# Patient Record
Sex: MALE | Race: WHITE | NOT HISPANIC OR LATINO | Employment: FULL TIME | RURAL
[De-identification: names, ages, dates, MRNs, and addresses within clinical notes are randomized per-mention and may not be internally consistent; named-entity substitution may affect disease eponyms.]

---

## 2020-08-03 ENCOUNTER — HISTORICAL (OUTPATIENT)
Dept: ADMINISTRATIVE | Facility: HOSPITAL | Age: 65
End: 2020-08-03

## 2020-09-29 LAB — CRC RECOMMENDATION EXT: NORMAL

## 2020-09-30 ENCOUNTER — HISTORICAL (OUTPATIENT)
Dept: ADMINISTRATIVE | Facility: HOSPITAL | Age: 65
End: 2020-09-30

## 2020-10-01 LAB
LAB AP CLINICAL INFORMATION: NORMAL
LAB AP DIAGNOSIS - HISTORICAL: NORMAL
LAB AP GROSS PATHOLOGY - HISTORICAL: NORMAL
LAB AP SPECIMEN SUBMITTED - HISTORICAL: NORMAL

## 2020-10-08 ENCOUNTER — HISTORICAL (OUTPATIENT)
Dept: ADMINISTRATIVE | Facility: HOSPITAL | Age: 65
End: 2020-10-08

## 2020-10-08 LAB
BASOPHILS # BLD AUTO: 0.08 X10E3/UL (ref 0–0.2)
BASOPHILS NFR BLD AUTO: 1.1 % (ref 0–1)
EOSINOPHIL # BLD AUTO: 0.15 X10E3/UL (ref 0–0.5)
EOSINOPHIL NFR BLD AUTO: 2 % (ref 1–4)
ERYTHROCYTE [DISTWIDTH] IN BLOOD BY AUTOMATED COUNT: 17.5 % (ref 11.5–14.5)
FERRITIN SERPL-MCNC: 18 NG/ML (ref 26–388)
HCT VFR BLD AUTO: 40 % (ref 40–54)
HGB BLD-MCNC: 12.6 G/DL (ref 13.5–18)
IMM GRANULOCYTES # BLD AUTO: 0.11 X10E3/UL (ref 0–0.04)
IMM GRANULOCYTES NFR BLD: 1.4 % (ref 0–0.4)
IRON SATN MFR SERPL: 14 % (ref 14–50)
IRON SERPL-MCNC: 68 UG/DL (ref 65–175)
LYMPHOCYTES # BLD AUTO: 1.31 X10E3/UL (ref 1–4.8)
LYMPHOCYTES NFR BLD AUTO: 17.3 % (ref 27–41)
MCH RBC QN AUTO: 29.6 PG (ref 27–31)
MCHC RBC AUTO-ENTMCNC: 31.5 G/DL (ref 32–36)
MCV RBC AUTO: 93.9 FL (ref 80–96)
MONOCYTES # BLD AUTO: 0.61 X10E3/UL (ref 0–0.8)
MONOCYTES NFR BLD AUTO: 8 % (ref 2–6)
MPC BLD CALC-MCNC: 11.6 FL (ref 9.4–12.4)
NEUTROPHILS # BLD AUTO: 5.33 X10E3/UL (ref 1.8–7.7)
NEUTROPHILS NFR BLD AUTO: 70.2 % (ref 53–65)
NRBC # BLD AUTO: 0 X10E3/UL (ref 0–0)
NRBC, AUTO (.00): 0 /100 (ref 0–0)
PLATELET # BLD AUTO: 268 X10E3/UL (ref 150–400)
RBC # BLD AUTO: 4.26 X10E6/UL (ref 4.6–6.2)
TIBC SERPL-MCNC: 477 UG/DL (ref 250–450)
WBC # BLD AUTO: 7.59 X10E3/UL (ref 4.5–11)

## 2021-07-23 VITALS — WEIGHT: 186 LBS | HEIGHT: 72 IN | BODY MASS INDEX: 25.19 KG/M2

## 2021-07-23 RX ORDER — CETIRIZINE HYDROCHLORIDE 10 MG/1
10 TABLET ORAL DAILY
COMMUNITY
End: 2022-02-11

## 2021-07-23 RX ORDER — METFORMIN HYDROCHLORIDE 1000 MG/1
1000 TABLET ORAL 2 TIMES DAILY WITH MEALS
COMMUNITY
Start: 2021-05-03 | End: 2021-08-11 | Stop reason: SDUPTHER

## 2021-07-23 RX ORDER — FERROUS SULFATE 325(65) MG
325 TABLET ORAL
COMMUNITY
End: 2021-08-11 | Stop reason: SDUPTHER

## 2021-07-23 RX ORDER — ZINC GLUCONATE 50 MG
50 TABLET ORAL DAILY
COMMUNITY
End: 2022-02-11

## 2021-07-23 RX ORDER — GEMFIBROZIL 600 MG/1
600 TABLET, FILM COATED ORAL 2 TIMES DAILY
COMMUNITY
Start: 2021-05-03 | End: 2021-08-11 | Stop reason: SDUPTHER

## 2021-07-23 RX ORDER — CARVEDILOL 25 MG/1
25 TABLET ORAL 2 TIMES DAILY
COMMUNITY
Start: 2021-04-23 | End: 2022-01-18

## 2021-07-23 RX ORDER — APIXABAN 5 MG/1
5 TABLET, FILM COATED ORAL 2 TIMES DAILY
COMMUNITY
Start: 2021-05-03 | End: 2021-08-11 | Stop reason: SDUPTHER

## 2021-07-23 RX ORDER — SITAGLIPTIN 100 MG/1
100 TABLET, FILM COATED ORAL DAILY
COMMUNITY
Start: 2021-04-23 | End: 2021-08-11 | Stop reason: SDUPTHER

## 2021-07-23 RX ORDER — GLIMEPIRIDE 4 MG/1
2 TABLET ORAL
COMMUNITY
Start: 2021-03-23 | End: 2021-08-11 | Stop reason: SDUPTHER

## 2021-07-23 RX ORDER — DAPAGLIFLOZIN 10 MG/1
10 TABLET, FILM COATED ORAL DAILY
COMMUNITY
Start: 2021-06-03 | End: 2021-08-11 | Stop reason: SDUPTHER

## 2021-07-27 ENCOUNTER — OFFICE VISIT (OUTPATIENT)
Dept: CARDIOLOGY | Facility: CLINIC | Age: 66
End: 2021-07-27
Payer: MEDICARE

## 2021-07-27 VITALS
HEART RATE: 92 BPM | BODY MASS INDEX: 25.87 KG/M2 | DIASTOLIC BLOOD PRESSURE: 72 MMHG | HEIGHT: 72 IN | WEIGHT: 191 LBS | OXYGEN SATURATION: 97 % | SYSTOLIC BLOOD PRESSURE: 128 MMHG

## 2021-07-27 DIAGNOSIS — I48.91 ATRIAL FIBRILLATION, UNSPECIFIED TYPE: Primary | ICD-10-CM

## 2021-07-27 DIAGNOSIS — I10 HYPERTENSION, UNSPECIFIED TYPE: ICD-10-CM

## 2021-07-27 DIAGNOSIS — I44.2 COMPLETE AV BLOCK: ICD-10-CM

## 2021-07-27 DIAGNOSIS — I48.0 PAROXYSMAL ATRIAL FIBRILLATION: ICD-10-CM

## 2021-07-27 DIAGNOSIS — E78.5 HYPERLIPIDEMIA, UNSPECIFIED HYPERLIPIDEMIA TYPE: ICD-10-CM

## 2021-07-27 DIAGNOSIS — I25.10 CORONARY ARTERY DISEASE INVOLVING NATIVE CORONARY ARTERY OF NATIVE HEART WITHOUT ANGINA PECTORIS: ICD-10-CM

## 2021-07-27 PROCEDURE — 93010 EKG 12-LEAD: ICD-10-PCS | Mod: S$PBB,,, | Performed by: INTERNAL MEDICINE

## 2021-07-27 PROCEDURE — 99214 OFFICE O/P EST MOD 30 MIN: CPT | Mod: PBBFAC | Performed by: NURSE PRACTITIONER

## 2021-07-27 PROCEDURE — 99214 PR OFFICE/OUTPT VISIT, EST, LEVL IV, 30-39 MIN: ICD-10-PCS | Mod: S$PBB,,, | Performed by: NURSE PRACTITIONER

## 2021-07-27 PROCEDURE — 93005 ELECTROCARDIOGRAM TRACING: CPT | Mod: PBBFAC | Performed by: INTERNAL MEDICINE

## 2021-07-27 PROCEDURE — 93010 ELECTROCARDIOGRAM REPORT: CPT | Mod: S$PBB,,, | Performed by: INTERNAL MEDICINE

## 2021-07-27 PROCEDURE — 99214 OFFICE O/P EST MOD 30 MIN: CPT | Mod: S$PBB,,, | Performed by: NURSE PRACTITIONER

## 2021-07-27 RX ORDER — ASPIRIN 81 MG/1
81 TABLET ORAL DAILY
COMMUNITY

## 2021-07-28 PROBLEM — I44.2 COMPLETE AV BLOCK: Status: ACTIVE | Noted: 2021-07-28

## 2021-07-30 DIAGNOSIS — Z95.0 PRESENCE OF PERMANENT CARDIAC PACEMAKER: Primary | ICD-10-CM

## 2021-08-06 ENCOUNTER — HOSPITAL ENCOUNTER (OUTPATIENT)
Dept: CARDIOLOGY | Facility: HOSPITAL | Age: 66
Discharge: HOME | End: 2021-08-06
Attending: INTERNAL MEDICINE
Payer: MEDICARE

## 2021-08-06 DIAGNOSIS — Z95.0 PRESENCE OF PERMANENT CARDIAC PACEMAKER: ICD-10-CM

## 2021-08-06 LAB
BATTERY VOLTAGE (V): 2.99 V
OHS CV DC PP MS1: 0.4 MS
OHS CV DC PP MS2: 0.4 MS
OHS CV DC PP V1: 1.5 V
OHS CV DC PP V2: 2 V

## 2021-08-06 PROCEDURE — 93280 PM DEVICE PROGR EVAL DUAL: CPT | Mod: 26,,, | Performed by: INTERNAL MEDICINE

## 2021-08-06 PROCEDURE — 93280 PM DEVICE PROGR EVAL DUAL: CPT

## 2021-08-06 PROCEDURE — 93280 CARDIAC DEVICE CHECK - IN CLINIC & HOSPITAL: ICD-10-PCS | Mod: 26,,, | Performed by: INTERNAL MEDICINE

## 2021-08-11 ENCOUNTER — OFFICE VISIT (OUTPATIENT)
Dept: FAMILY MEDICINE | Facility: CLINIC | Age: 66
End: 2021-08-11
Payer: MEDICARE

## 2021-08-11 VITALS
HEART RATE: 97 BPM | DIASTOLIC BLOOD PRESSURE: 62 MMHG | TEMPERATURE: 97 F | WEIGHT: 190.63 LBS | BODY MASS INDEX: 25.85 KG/M2 | OXYGEN SATURATION: 99 % | SYSTOLIC BLOOD PRESSURE: 120 MMHG | RESPIRATION RATE: 16 BRPM

## 2021-08-11 DIAGNOSIS — Z86.2 H/O IRON DEFICIENCY ANEMIA: ICD-10-CM

## 2021-08-11 DIAGNOSIS — I25.10 CORONARY ARTERY DISEASE INVOLVING NATIVE CORONARY ARTERY OF NATIVE HEART WITHOUT ANGINA PECTORIS: ICD-10-CM

## 2021-08-11 DIAGNOSIS — E78.5 HYPERLIPIDEMIA, UNSPECIFIED HYPERLIPIDEMIA TYPE: ICD-10-CM

## 2021-08-11 DIAGNOSIS — Z86.79 HISTORY OF PERICARDITIS: ICD-10-CM

## 2021-08-11 DIAGNOSIS — J43.1 PANLOBULAR EMPHYSEMA: ICD-10-CM

## 2021-08-11 DIAGNOSIS — E11.9 TYPE 2 DIABETES MELLITUS WITHOUT COMPLICATION, WITHOUT LONG-TERM CURRENT USE OF INSULIN: Primary | ICD-10-CM

## 2021-08-11 DIAGNOSIS — K21.9 GASTROESOPHAGEAL REFLUX DISEASE WITHOUT ESOPHAGITIS: ICD-10-CM

## 2021-08-11 DIAGNOSIS — Z12.5 SCREENING PSA (PROSTATE SPECIFIC ANTIGEN): ICD-10-CM

## 2021-08-11 DIAGNOSIS — K75.81 NASH (NONALCOHOLIC STEATOHEPATITIS): ICD-10-CM

## 2021-08-11 LAB
ALBUMIN SERPL BCP-MCNC: 4.1 G/DL (ref 3.5–5)
ALP SERPL-CCNC: 46 U/L (ref 45–115)
ALT SERPL W P-5'-P-CCNC: 26 U/L (ref 16–61)
ANION GAP SERPL CALCULATED.3IONS-SCNC: 16 MMOL/L (ref 7–16)
AST SERPL W P-5'-P-CCNC: 20 U/L (ref 15–37)
BASOPHILS # BLD AUTO: 0.05 K/UL (ref 0–0.2)
BASOPHILS NFR BLD AUTO: 1.1 % (ref 0–1)
BASOPHILS NFR BLD MANUAL: 1 % (ref 0–1)
BILIRUB DIRECT SERPL-MCNC: <0.1 MG/DL (ref 0–0.2)
BILIRUB SERPL-MCNC: 0.4 MG/DL (ref 0–1.2)
BUN SERPL-MCNC: 27 MG/DL (ref 7–18)
BUN/CREAT SERPL: 27 (ref 6–20)
CALCIUM SERPL-MCNC: 9.2 MG/DL (ref 8.5–10.1)
CHLORIDE SERPL-SCNC: 101 MMOL/L (ref 98–107)
CHOLEST SERPL-MCNC: 205 MG/DL (ref 0–200)
CHOLEST/HDLC SERPL: 8.2 {RATIO}
CO2 SERPL-SCNC: 22 MMOL/L (ref 21–32)
CREAT SERPL-MCNC: 1 MG/DL (ref 0.7–1.3)
DIFFERENTIAL METHOD BLD: ABNORMAL
EOSINOPHIL # BLD AUTO: 0.17 K/UL (ref 0–0.5)
EOSINOPHIL NFR BLD AUTO: 3.7 % (ref 1–4)
EOSINOPHIL NFR BLD MANUAL: 6 % (ref 1–4)
ERYTHROCYTE [DISTWIDTH] IN BLOOD BY AUTOMATED COUNT: 15.2 % (ref 11.5–14.5)
FERRITIN SERPL-MCNC: 22 NG/ML (ref 26–388)
GLUCOSE SERPL-MCNC: 127 MG/DL (ref 74–106)
HCT VFR BLD AUTO: 38.4 % (ref 40–54)
HDLC SERPL-MCNC: 25 MG/DL (ref 40–60)
HGB BLD-MCNC: 12.4 G/DL (ref 13.5–18)
IMM GRANULOCYTES # BLD AUTO: 0.17 K/UL (ref 0–0.04)
IMM GRANULOCYTES NFR BLD: 3.7 % (ref 0–0.4)
LDLC SERPL CALC-MCNC: 107 MG/DL
LDLC/HDLC SERPL: 4.3 {RATIO}
LYMPHOCYTES # BLD AUTO: 1.13 K/UL (ref 1–4.8)
LYMPHOCYTES NFR BLD AUTO: 24.7 % (ref 27–41)
LYMPHOCYTES NFR BLD MANUAL: 26 % (ref 27–41)
MCH RBC QN AUTO: 30.8 PG (ref 27–31)
MCHC RBC AUTO-ENTMCNC: 32.3 G/DL (ref 32–36)
MCV RBC AUTO: 95.5 FL (ref 80–96)
MONOCYTES # BLD AUTO: 0.48 K/UL (ref 0–0.8)
MONOCYTES NFR BLD AUTO: 10.5 % (ref 2–6)
MONOCYTES NFR BLD MANUAL: 12 % (ref 2–6)
MPC BLD CALC-MCNC: 11.4 FL (ref 9.4–12.4)
NEUTROPHILS # BLD AUTO: 2.57 K/UL (ref 1.8–7.7)
NEUTROPHILS NFR BLD AUTO: 56.3 % (ref 53–65)
NEUTS SEG NFR BLD MANUAL: 55 % (ref 50–62)
NONHDLC SERPL-MCNC: 180 MG/DL
NRBC # BLD AUTO: 0 X10E3/UL
NRBC, AUTO (.00): 0 %
PLATELET # BLD AUTO: 198 K/UL (ref 150–400)
PLATELET MORPHOLOGY: ABNORMAL
POTASSIUM SERPL-SCNC: 4.9 MMOL/L (ref 3.5–5.1)
PROT SERPL-MCNC: 8.2 G/DL (ref 6.4–8.2)
PSA SERPL-MCNC: 0.23 NG/ML (ref 0–4.1)
RBC # BLD AUTO: 4.02 M/UL (ref 4.6–6.2)
RBC MORPH BLD: NORMAL
SODIUM SERPL-SCNC: 134 MMOL/L (ref 136–145)
TRIGL SERPL-MCNC: 367 MG/DL (ref 35–150)
VLDLC SERPL-MCNC: 73 MG/DL
WBC # BLD AUTO: 4.57 K/UL (ref 4.5–11)

## 2021-08-11 PROCEDURE — 99214 OFFICE O/P EST MOD 30 MIN: CPT | Mod: ,,, | Performed by: INTERNAL MEDICINE

## 2021-08-11 PROCEDURE — 80061 LIPID PANEL: CPT | Mod: ,,, | Performed by: CLINICAL MEDICAL LABORATORY

## 2021-08-11 PROCEDURE — 83036 HEMOGLOBIN GLYCOSYLATED A1C: CPT | Mod: ,,, | Performed by: CLINICAL MEDICAL LABORATORY

## 2021-08-11 PROCEDURE — 85025 COMPLETE CBC W/AUTO DIFF WBC: CPT | Mod: ,,, | Performed by: CLINICAL MEDICAL LABORATORY

## 2021-08-11 PROCEDURE — 82248 HEPATIC FUNCTION PANEL: ICD-10-PCS | Mod: ,,, | Performed by: CLINICAL MEDICAL LABORATORY

## 2021-08-11 PROCEDURE — 80061 LIPID PANEL: ICD-10-PCS | Mod: ,,, | Performed by: CLINICAL MEDICAL LABORATORY

## 2021-08-11 PROCEDURE — 83036 HEMOGLOBIN A1C: ICD-10-PCS | Mod: ,,, | Performed by: CLINICAL MEDICAL LABORATORY

## 2021-08-11 PROCEDURE — 82248 BILIRUBIN DIRECT: CPT | Mod: ,,, | Performed by: CLINICAL MEDICAL LABORATORY

## 2021-08-11 PROCEDURE — G0103 PSA, SCREENING: ICD-10-PCS | Mod: ,,, | Performed by: CLINICAL MEDICAL LABORATORY

## 2021-08-11 PROCEDURE — 99214 PR OFFICE/OUTPT VISIT, EST, LEVL IV, 30-39 MIN: ICD-10-PCS | Mod: ,,, | Performed by: INTERNAL MEDICINE

## 2021-08-11 PROCEDURE — 82728 ASSAY OF FERRITIN: CPT | Mod: ,,, | Performed by: CLINICAL MEDICAL LABORATORY

## 2021-08-11 PROCEDURE — 80053 BASIC METABOLIC PANEL: ICD-10-PCS | Mod: ,,, | Performed by: CLINICAL MEDICAL LABORATORY

## 2021-08-11 PROCEDURE — 85025 CBC WITH DIFFERENTIAL: ICD-10-PCS | Mod: ,,, | Performed by: CLINICAL MEDICAL LABORATORY

## 2021-08-11 PROCEDURE — 82728 FERRITIN: ICD-10-PCS | Mod: ,,, | Performed by: CLINICAL MEDICAL LABORATORY

## 2021-08-11 PROCEDURE — G0103 PSA SCREENING: HCPCS | Mod: ,,, | Performed by: CLINICAL MEDICAL LABORATORY

## 2021-08-11 PROCEDURE — 80053 COMPREHEN METABOLIC PANEL: CPT | Mod: ,,, | Performed by: CLINICAL MEDICAL LABORATORY

## 2021-08-11 RX ORDER — FERROUS SULFATE 325(65) MG
325 TABLET ORAL
Qty: 270 TABLET | Refills: 1 | Status: SHIPPED | OUTPATIENT
Start: 2021-08-11 | End: 2022-11-11

## 2021-08-11 RX ORDER — ACETAMINOPHEN 500 MG
1 TABLET ORAL DAILY
COMMUNITY
End: 2022-02-11

## 2021-08-11 RX ORDER — ASCORBIC ACID 500 MG
500 TABLET ORAL DAILY
COMMUNITY
End: 2022-02-11

## 2021-08-11 RX ORDER — GLIMEPIRIDE 4 MG/1
2 TABLET ORAL
Qty: 45 TABLET | Refills: 1 | Status: SHIPPED | OUTPATIENT
Start: 2021-08-11 | End: 2022-02-11 | Stop reason: SDUPTHER

## 2021-08-11 RX ORDER — DAPAGLIFLOZIN 10 MG/1
10 TABLET, FILM COATED ORAL DAILY
Qty: 90 TABLET | Refills: 1 | Status: SHIPPED | OUTPATIENT
Start: 2021-08-11 | End: 2022-07-29

## 2021-08-11 RX ORDER — METFORMIN HYDROCHLORIDE 1000 MG/1
1000 TABLET ORAL 2 TIMES DAILY WITH MEALS
Qty: 180 TABLET | Refills: 1 | Status: SHIPPED | OUTPATIENT
Start: 2021-08-11 | End: 2022-02-11 | Stop reason: SDUPTHER

## 2021-08-11 RX ORDER — APIXABAN 5 MG/1
5 TABLET, FILM COATED ORAL 2 TIMES DAILY
Qty: 180 TABLET | Refills: 1 | Status: SHIPPED | OUTPATIENT
Start: 2021-08-11 | End: 2022-02-11 | Stop reason: SDUPTHER

## 2021-08-11 RX ORDER — GEMFIBROZIL 600 MG/1
600 TABLET, FILM COATED ORAL 2 TIMES DAILY
Qty: 180 TABLET | Refills: 1 | Status: SHIPPED | OUTPATIENT
Start: 2021-08-11 | End: 2022-02-11 | Stop reason: SDUPTHER

## 2021-08-11 RX ORDER — SITAGLIPTIN 100 MG/1
100 TABLET, FILM COATED ORAL DAILY
Qty: 90 TABLET | Refills: 1 | Status: SHIPPED | OUTPATIENT
Start: 2021-08-11 | End: 2022-01-18

## 2021-08-12 DIAGNOSIS — E11.9 TYPE 2 DIABETES MELLITUS WITHOUT COMPLICATION, WITHOUT LONG-TERM CURRENT USE OF INSULIN: Primary | ICD-10-CM

## 2021-08-12 LAB
EST. AVERAGE GLUCOSE BLD GHB EST-MCNC: 84 MG/DL
HBA1C MFR BLD HPLC: 5.1 % (ref 4.5–6.6)

## 2021-09-27 LAB
LEFT EYE DM RETINOPATHY: NORMAL
RIGHT EYE DM RETINOPATHY: NORMAL

## 2021-09-29 DIAGNOSIS — Z95.0 PRESENCE OF CARDIAC PACEMAKER: Primary | ICD-10-CM

## 2021-10-13 ENCOUNTER — HOSPITAL ENCOUNTER (OUTPATIENT)
Dept: CARDIOLOGY | Facility: HOSPITAL | Age: 66
Discharge: HOME | End: 2021-10-13
Attending: INTERNAL MEDICINE
Payer: MEDICARE

## 2021-10-13 DIAGNOSIS — Z95.0 PRESENCE OF CARDIAC PACEMAKER: Primary | ICD-10-CM

## 2021-10-13 DIAGNOSIS — Z95.0 PRESENCE OF CARDIAC PACEMAKER: ICD-10-CM

## 2021-10-13 PROCEDURE — 93296 REM INTERROG EVL PM/IDS: CPT

## 2021-10-13 PROCEDURE — 93294 CARDIAC DEVICE CHECK - REMOTE: ICD-10-PCS | Mod: ,,, | Performed by: INTERNAL MEDICINE

## 2021-10-13 PROCEDURE — 93294 REM INTERROG EVL PM/LDLS PM: CPT | Mod: ,,, | Performed by: INTERNAL MEDICINE

## 2021-10-29 LAB
OHS CV DC PP MS1: 0.4 MS
OHS CV DC PP MS2: 0.4 MS
OHS CV DC PP V1: 1.5 V
OHS CV DC PP V2: 2 V

## 2022-01-13 ENCOUNTER — HOSPITAL ENCOUNTER (OUTPATIENT)
Dept: CARDIOLOGY | Facility: HOSPITAL | Age: 67
Discharge: HOME | End: 2022-01-13
Attending: INTERNAL MEDICINE
Payer: MEDICARE

## 2022-01-13 DIAGNOSIS — Z95.0 PRESENCE OF CARDIAC PACEMAKER: ICD-10-CM

## 2022-01-13 DIAGNOSIS — Z95.0 PRESENCE OF CARDIAC PACEMAKER: Primary | ICD-10-CM

## 2022-01-13 PROCEDURE — 93296 REM INTERROG EVL PM/IDS: CPT

## 2022-01-13 PROCEDURE — 93294 CARDIAC DEVICE CHECK - REMOTE: ICD-10-PCS | Mod: ,,, | Performed by: INTERNAL MEDICINE

## 2022-01-13 PROCEDURE — 93294 REM INTERROG EVL PM/LDLS PM: CPT | Mod: ,,, | Performed by: INTERNAL MEDICINE

## 2022-02-02 ENCOUNTER — OFFICE VISIT (OUTPATIENT)
Dept: CARDIOLOGY | Facility: CLINIC | Age: 67
End: 2022-02-02
Payer: MEDICARE

## 2022-02-02 VITALS
SYSTOLIC BLOOD PRESSURE: 138 MMHG | DIASTOLIC BLOOD PRESSURE: 70 MMHG | WEIGHT: 188 LBS | HEART RATE: 84 BPM | OXYGEN SATURATION: 97 % | HEIGHT: 72 IN | BODY MASS INDEX: 25.47 KG/M2

## 2022-02-02 DIAGNOSIS — I10 HYPERTENSION, UNSPECIFIED TYPE: ICD-10-CM

## 2022-02-02 DIAGNOSIS — I10 HYPERTENSION, UNSPECIFIED TYPE: Primary | ICD-10-CM

## 2022-02-02 PROCEDURE — 93010 ELECTROCARDIOGRAM REPORT: CPT | Mod: S$PBB,,, | Performed by: INTERNAL MEDICINE

## 2022-02-02 PROCEDURE — 93010 EKG 12-LEAD: ICD-10-PCS | Mod: S$PBB,,, | Performed by: INTERNAL MEDICINE

## 2022-02-02 PROCEDURE — 99214 PR OFFICE/OUTPT VISIT, EST, LEVL IV, 30-39 MIN: ICD-10-PCS | Mod: S$PBB,,, | Performed by: NURSE PRACTITIONER

## 2022-02-02 PROCEDURE — 93005 ELECTROCARDIOGRAM TRACING: CPT | Mod: PBBFAC | Performed by: INTERNAL MEDICINE

## 2022-02-02 PROCEDURE — 99214 OFFICE O/P EST MOD 30 MIN: CPT | Mod: PBBFAC | Performed by: NURSE PRACTITIONER

## 2022-02-02 PROCEDURE — 99214 OFFICE O/P EST MOD 30 MIN: CPT | Mod: S$PBB,,, | Performed by: NURSE PRACTITIONER

## 2022-02-02 NOTE — PROGRESS NOTES
[facility] note        DATE OF SERVICE: 02/02/2022       PCP: Shivam Payne MD      CHIEF COMPLAINT:   Chief Complaint   Patient presents with    Follow-up     Patient denies any cardiac complaints today.        HISTORY OF PRESENT ILLNESS:  Pro Aguirre is a 66 y.o. male with a PMH of   Past Medical History:   Diagnosis Date    ARF (acute renal failure)     Atrial fibrillation     Carotid artery occlusion     normal dopplers 8/20    COPD (chronic obstructive pulmonary disease)     Coronary artery disease     Diabetes mellitus, type 2     GERD (gastroesophageal reflux disease)     H/O iron deficiency anemia     History of pericarditis     Hyperlipidemia     Hypertension     GARCÍA (nonalcoholic steatohepatitis)      who presents for routine follow up.   Chief Complaint   Patient presents with    Follow-up     Patient denies any cardiac complaints today.            PAST MEDICAL HISTORY:  Past Medical History:   Diagnosis Date    ARF (acute renal failure)     Atrial fibrillation     Carotid artery occlusion     normal dopplers 8/20    COPD (chronic obstructive pulmonary disease)     Coronary artery disease     Diabetes mellitus, type 2     GERD (gastroesophageal reflux disease)     H/O iron deficiency anemia     History of pericarditis     Hyperlipidemia     Hypertension     GARCÍA (nonalcoholic steatohepatitis)        PAST SURGICAL HISTORY:  Past Surgical History:   Procedure Laterality Date    APPENDECTOMY      TONSILLECTOMY         SOCIAL HISTORY:  Social History     Socioeconomic History    Marital status:    Tobacco Use    Smoking status: Never Smoker    Smokeless tobacco: Never Used   Substance and Sexual Activity    Alcohol use: Not Currently    Drug use: Never       FAMILY HISTORY:  Family History   Problem Relation Age of Onset    Hypertension Mother     Diabetes Mother          ALLERGIES:  Review of patient's allergies indicates:  No Known  Allergies     MEDICATIONS:    Current Outpatient Medications:     aspirin (ECOTRIN) 81 MG EC tablet, Take 81 mg by mouth once daily., Disp: , Rfl:     carvediloL (COREG) 25 MG tablet, TAKE ONE (1) TABLET BY MOUTH TWICE DAILY FOR BLOOD PRESSURE, Disp: 180 tablet, Rfl: 1    ELIQUIS 5 mg Tab, Take 1 tablet (5 mg total) by mouth 2 (two) times daily., Disp: 180 tablet, Rfl: 1    FARXIGA 10 mg tablet, Take 1 tablet (10 mg total) by mouth once daily., Disp: 90 tablet, Rfl: 1    ferrous sulfate (FEOSOL) 325 mg (65 mg iron) Tab tablet, Take 1 tablet (325 mg total) by mouth daily with breakfast., Disp: 270 tablet, Rfl: 1    gemfibroziL (LOPID) 600 MG tablet, Take 1 tablet (600 mg total) by mouth 2 (two) times a day., Disp: 180 tablet, Rfl: 1    glimepiride (AMARYL) 4 MG tablet, Take 0.5 tablets (2 mg total) by mouth daily with breakfast. (Patient taking differently: Take 2 mg by mouth 2 (two) times daily.), Disp: 45 tablet, Rfl: 1    JANUVIA 100 mg Tab, TAKE ONE (1) TABLET (100 MILLIGRAM TOTAL) BY MOUTH ONCE DAILY., Disp: 90 tablet, Rfl: 1    metFORMIN (GLUCOPHAGE) 1000 MG tablet, Take 1 tablet (1,000 mg total) by mouth 2 (two) times daily with meals., Disp: 180 tablet, Rfl: 1    TRELEGY ELLIPTA 100-62.5-25 mcg DsDv, INHALE 1 PUFF BY MOUTH DAILY FOR BREATHING, Disp: 3 each, Rfl: 1    ascorbic acid, vitamin C, (VITAMIN C) 500 MG tablet, Take 500 mg by mouth once daily., Disp: , Rfl:     cetirizine (ZYRTEC) 10 MG tablet, Take 10 mg by mouth once daily., Disp: , Rfl:     cholecalciferol, vitamin D3, (VITAMIN D3) 50 mcg (2,000 unit) Cap, Take 1 capsule by mouth once daily., Disp: , Rfl:     zinc gluconate 50 mg tablet, Take 50 mg by mouth once daily., Disp: , Rfl:   Medications have been reviewed and reconciled.     PHYSICAL EXAM:  /70 (BP Location: Left arm, Patient Position: Sitting)   Pulse 84   Ht 6' (1.829 m)   Wt 85.3 kg (188 lb)   SpO2 97%   BMI 25.50 kg/m²   Wt Readings from Last 3 Encounters:    02/02/22 85.3 kg (188 lb)   08/11/21 86.5 kg (190 lb 9.6 oz)   07/27/21 86.6 kg (191 lb)      Body mass index is 25.5 kg/m².    Physical Exam  Vitals and nursing note reviewed.   Constitutional:       Appearance: Normal appearance. He is normal weight.   HENT:      Head: Normocephalic and atraumatic.   Eyes:      Pupils: Pupils are equal, round, and reactive to light.   Neck:      Vascular: No carotid bruit.   Cardiovascular:      Rate and Rhythm: Normal rate and regular rhythm.      Pulses: Normal pulses.      Heart sounds: Normal heart sounds.      Comments: Well healed ppm site to left upper chest wall    Pulmonary:      Effort: Pulmonary effort is normal.      Breath sounds: Normal breath sounds.   Abdominal:      General: Bowel sounds are normal.      Palpations: Abdomen is soft.   Musculoskeletal:      Cervical back: Neck supple.      Right lower leg: No edema.      Left lower leg: No edema.   Skin:     General: Skin is warm and dry.      Capillary Refill: Capillary refill takes less than 2 seconds.   Neurological:      General: No focal deficit present.      Mental Status: He is alert and oriented to person, place, and time.   Psychiatric:         Mood and Affect: Mood normal.         Behavior: Behavior normal.         LABS REVIEWED:  Lab Results   Component Value Date    WBC 4.57 08/11/2021    RBC 4.02 (L) 08/11/2021    HGB 12.4 (L) 08/11/2021    HCT 38.4 (L) 08/11/2021    MCV 95.5 08/11/2021    MCH 30.8 08/11/2021    MCHC 32.3 08/11/2021    RDW 15.2 (H) 08/11/2021     08/11/2021    MPV 11.4 08/11/2021    NRBC 0.0 08/11/2021     Lab Results   Component Value Date     (L) 08/11/2021    K 4.9 08/11/2021     08/11/2021    CO2 22 08/11/2021    BUN 27 (H) 08/11/2021     Lab Results   Component Value Date    AST 20 08/11/2021    ALT 26 08/11/2021     Lab Results   Component Value Date     (H) 08/11/2021    HGBA1C 5.1 08/11/2021     Lab Results   Component Value Date    CHOL 205 (H)  08/11/2021    HDL 25 (L) 08/11/2021    TRIG 367 (H) 08/11/2021    CHOLHDL 8.2 08/11/2021       CARDIAC STUDIES REVIEWED:EKG: RSR with RBBB and old inferior q waves; HR 87 bpm      ASSESSMENT:   Patient Active Problem List   Diagnosis    Complete AV block    Hypertension    Hyperlipidemia    Coronary artery disease    Atrial fibrillation    GERD (gastroesophageal reflux disease)    COPD (chronic obstructive pulmonary disease)    H/O iron deficiency anemia    GARCÍA (nonalcoholic steatohepatitis)    Diabetes mellitus, type 2    History of pericarditis            Problem List Items Addressed This Visit        Cardiac/Vascular    Hypertension           PLAN:     RTC: 6 months

## 2022-02-09 LAB
BATTERY VOLTAGE (V): 2.98 V
OHS CV DC PP MS1: 0.4 MS
OHS CV DC PP MS2: 0.4 MS
OHS CV DC PP V1: 1.5 V
OHS CV DC PP V2: 2 V

## 2022-02-11 ENCOUNTER — OFFICE VISIT (OUTPATIENT)
Dept: FAMILY MEDICINE | Facility: CLINIC | Age: 67
End: 2022-02-11
Payer: MEDICARE

## 2022-02-11 VITALS
HEART RATE: 100 BPM | DIASTOLIC BLOOD PRESSURE: 76 MMHG | BODY MASS INDEX: 25.44 KG/M2 | RESPIRATION RATE: 16 BRPM | TEMPERATURE: 98 F | SYSTOLIC BLOOD PRESSURE: 122 MMHG | OXYGEN SATURATION: 99 % | WEIGHT: 187.63 LBS

## 2022-02-11 DIAGNOSIS — E11.9 TYPE 2 DIABETES MELLITUS WITHOUT COMPLICATION, WITHOUT LONG-TERM CURRENT USE OF INSULIN: Primary | ICD-10-CM

## 2022-02-11 DIAGNOSIS — I25.10 CORONARY ARTERY DISEASE INVOLVING NATIVE CORONARY ARTERY OF NATIVE HEART WITHOUT ANGINA PECTORIS: ICD-10-CM

## 2022-02-11 DIAGNOSIS — J43.1 PANLOBULAR EMPHYSEMA: ICD-10-CM

## 2022-02-11 DIAGNOSIS — Z86.2 H/O IRON DEFICIENCY ANEMIA: ICD-10-CM

## 2022-02-11 DIAGNOSIS — E78.5 HYPERLIPIDEMIA, UNSPECIFIED HYPERLIPIDEMIA TYPE: ICD-10-CM

## 2022-02-11 DIAGNOSIS — Z11.59 ENCOUNTER FOR HEPATITIS C SCREENING TEST FOR LOW RISK PATIENT: ICD-10-CM

## 2022-02-11 LAB
BASOPHILS # BLD AUTO: 0.09 K/UL (ref 0–0.2)
BASOPHILS NFR BLD AUTO: 1.7 % (ref 0–1)
CHOLEST SERPL-MCNC: 211 MG/DL (ref 0–200)
CHOLEST/HDLC SERPL: 7.5 {RATIO}
CREAT UR-MCNC: 37 MG/DL (ref 39–259)
DIFFERENTIAL METHOD BLD: ABNORMAL
EOSINOPHIL # BLD AUTO: 0.21 K/UL (ref 0–0.5)
EOSINOPHIL NFR BLD AUTO: 3.9 % (ref 1–4)
ERYTHROCYTE [DISTWIDTH] IN BLOOD BY AUTOMATED COUNT: 14.6 % (ref 11.5–14.5)
EST. AVERAGE GLUCOSE BLD GHB EST-MCNC: 114 MG/DL
HBA1C MFR BLD HPLC: 6 % (ref 4.5–6.6)
HCT VFR BLD AUTO: 41.1 % (ref 40–54)
HCV AB SER QL: NORMAL
HDLC SERPL-MCNC: 28 MG/DL (ref 40–60)
HGB BLD-MCNC: 13.3 G/DL (ref 13.5–18)
IMM GRANULOCYTES # BLD AUTO: 0.16 K/UL (ref 0–0.04)
IMM GRANULOCYTES NFR BLD: 2.9 % (ref 0–0.4)
LDLC SERPL CALC-MCNC: 112 MG/DL
LDLC/HDLC SERPL: 4 {RATIO}
LYMPHOCYTES # BLD AUTO: 1.23 K/UL (ref 1–4.8)
LYMPHOCYTES NFR BLD AUTO: 22.6 % (ref 27–41)
MCH RBC QN AUTO: 29.7 PG (ref 27–31)
MCHC RBC AUTO-ENTMCNC: 32.4 G/DL (ref 32–36)
MCV RBC AUTO: 91.7 FL (ref 80–96)
MICROALBUMIN UR-MCNC: 14.2 MG/DL (ref 0–2.8)
MICROALBUMIN/CREAT RATIO PNL UR: 383.8 MG/G (ref 0–30)
MONOCYTES # BLD AUTO: 0.49 K/UL (ref 0–0.8)
MONOCYTES NFR BLD AUTO: 9 % (ref 2–6)
MPC BLD CALC-MCNC: 11.1 FL (ref 9.4–12.4)
NEUTROPHILS # BLD AUTO: 3.26 K/UL (ref 1.8–7.7)
NEUTROPHILS NFR BLD AUTO: 59.9 % (ref 53–65)
NONHDLC SERPL-MCNC: 183 MG/DL
NRBC # BLD AUTO: 0 X10E3/UL
NRBC, AUTO (.00): 0 %
PLATELET # BLD AUTO: 251 K/UL (ref 150–400)
RBC # BLD AUTO: 4.48 M/UL (ref 4.6–6.2)
TRIGL SERPL-MCNC: 356 MG/DL (ref 35–150)
VLDLC SERPL-MCNC: 71 MG/DL
WBC # BLD AUTO: 5.44 K/UL (ref 4.5–11)

## 2022-02-11 PROCEDURE — 86803 HEPATITIS C AB TEST: CPT | Mod: ,,, | Performed by: CLINICAL MEDICAL LABORATORY

## 2022-02-11 PROCEDURE — 80061 LIPID PANEL: CPT | Mod: ,,, | Performed by: CLINICAL MEDICAL LABORATORY

## 2022-02-11 PROCEDURE — 86803 HEPATITIS C ANTIBODY: ICD-10-PCS | Mod: ,,, | Performed by: CLINICAL MEDICAL LABORATORY

## 2022-02-11 PROCEDURE — 99214 OFFICE O/P EST MOD 30 MIN: CPT | Mod: ,,, | Performed by: INTERNAL MEDICINE

## 2022-02-11 PROCEDURE — 83036 HEMOGLOBIN A1C: ICD-10-PCS | Mod: ,,, | Performed by: CLINICAL MEDICAL LABORATORY

## 2022-02-11 PROCEDURE — 99214 PR OFFICE/OUTPT VISIT, EST, LEVL IV, 30-39 MIN: ICD-10-PCS | Mod: ,,, | Performed by: INTERNAL MEDICINE

## 2022-02-11 PROCEDURE — 85025 CBC WITH DIFFERENTIAL: ICD-10-PCS | Mod: ,,, | Performed by: CLINICAL MEDICAL LABORATORY

## 2022-02-11 PROCEDURE — 85025 COMPLETE CBC W/AUTO DIFF WBC: CPT | Mod: ,,, | Performed by: CLINICAL MEDICAL LABORATORY

## 2022-02-11 PROCEDURE — 82570 MICROALBUMIN / CREATININE RATIO URINE: ICD-10-PCS | Mod: ,,, | Performed by: CLINICAL MEDICAL LABORATORY

## 2022-02-11 PROCEDURE — 83036 HEMOGLOBIN GLYCOSYLATED A1C: CPT | Mod: ,,, | Performed by: CLINICAL MEDICAL LABORATORY

## 2022-02-11 PROCEDURE — 82043 MICROALBUMIN / CREATININE RATIO URINE: ICD-10-PCS | Mod: ,,, | Performed by: CLINICAL MEDICAL LABORATORY

## 2022-02-11 PROCEDURE — 82570 ASSAY OF URINE CREATININE: CPT | Mod: ,,, | Performed by: CLINICAL MEDICAL LABORATORY

## 2022-02-11 PROCEDURE — 82043 UR ALBUMIN QUANTITATIVE: CPT | Mod: ,,, | Performed by: CLINICAL MEDICAL LABORATORY

## 2022-02-11 PROCEDURE — 80061 LIPID PANEL: ICD-10-PCS | Mod: ,,, | Performed by: CLINICAL MEDICAL LABORATORY

## 2022-02-11 RX ORDER — METFORMIN HYDROCHLORIDE 1000 MG/1
1000 TABLET ORAL 2 TIMES DAILY WITH MEALS
Qty: 180 TABLET | Refills: 1 | Status: SHIPPED | OUTPATIENT
Start: 2022-02-11 | End: 2022-09-27

## 2022-02-11 RX ORDER — GLIMEPIRIDE 4 MG/1
2 TABLET ORAL 2 TIMES DAILY
Qty: 90 TABLET | Refills: 1 | Status: SHIPPED | OUTPATIENT
Start: 2022-02-11 | End: 2022-08-12 | Stop reason: SDUPTHER

## 2022-02-11 RX ORDER — APIXABAN 5 MG/1
5 TABLET, FILM COATED ORAL 2 TIMES DAILY
Qty: 180 TABLET | Refills: 1 | Status: SHIPPED | OUTPATIENT
Start: 2022-02-11 | End: 2022-08-12 | Stop reason: SDUPTHER

## 2022-02-11 RX ORDER — GEMFIBROZIL 600 MG/1
600 TABLET, FILM COATED ORAL 2 TIMES DAILY
Qty: 180 TABLET | Refills: 1 | Status: SHIPPED | OUTPATIENT
Start: 2022-02-11 | End: 2022-02-15

## 2022-02-11 RX ORDER — SITAGLIPTIN 100 MG/1
TABLET, FILM COATED ORAL
Qty: 90 TABLET | Refills: 1 | Status: SHIPPED | OUTPATIENT
Start: 2022-02-11 | End: 2022-08-12 | Stop reason: SDUPTHER

## 2022-02-11 NOTE — PROGRESS NOTES
New Clinic Note    Patient Name:  Pro Aguirre is a 66 y.o. male     Chief Complaint:    Chief Complaint   Patient presents with    Follow-up     Patient is here for a checkup today.         Subjective  HPI         Current Outpatient Medications:     aspirin (ECOTRIN) 81 MG EC tablet, Take 81 mg by mouth once daily., Disp: , Rfl:     carvediloL (COREG) 25 MG tablet, TAKE ONE (1) TABLET BY MOUTH TWICE DAILY FOR BLOOD PRESSURE, Disp: 180 tablet, Rfl: 1    FARXIGA 10 mg tablet, Take 1 tablet (10 mg total) by mouth once daily., Disp: 90 tablet, Rfl: 1    ferrous sulfate (FEOSOL) 325 mg (65 mg iron) Tab tablet, Take 1 tablet (325 mg total) by mouth daily with breakfast. (Patient taking differently: Take 325 mg by mouth 3 (three) times daily.), Disp: 270 tablet, Rfl: 1    ELIQUIS 5 mg Tab, Take 1 tablet (5 mg total) by mouth 2 (two) times daily., Disp: 180 tablet, Rfl: 1    fluticasone-umeclidin-vilanter (TRELEGY ELLIPTA) 100-62.5-25 mcg DsDv, INHALE 1 PUFF BY MOUTH DAILY FOR BREATHING, Disp: 3 each, Rfl: 1    gemfibroziL (LOPID) 600 MG tablet, Take 1 tablet (600 mg total) by mouth 2 (two) times a day., Disp: 180 tablet, Rfl: 1    glimepiride (AMARYL) 4 MG tablet, Take 0.5 tablets (2 mg total) by mouth 2 (two) times daily., Disp: 90 tablet, Rfl: 1    JANUVIA 100 mg Tab, TAKE ONE (1) TABLET (100 MILLIGRAM TOTAL) BY MOUTH ONCE DAILY., Disp: 90 tablet, Rfl: 1    metFORMIN (GLUCOPHAGE) 1000 MG tablet, Take 1 tablet (1,000 mg total) by mouth 2 (two) times daily with meals., Disp: 180 tablet, Rfl: 1   Past Medical History:   Diagnosis Date    ARF (acute renal failure)     Atrial fibrillation     Carotid artery occlusion     normal dopplers 8/20    COPD (chronic obstructive pulmonary disease)     Coronary artery disease     Diabetes mellitus, type 2     GERD (gastroesophageal reflux disease)     H/O iron deficiency anemia     History of pericarditis     Hyperlipidemia     Hypertension     GARCÍA  (nonalcoholic steatohepatitis)       Past Surgical History:   Procedure Laterality Date    APPENDECTOMY      TONSILLECTOMY        Family History   Problem Relation Age of Onset    Hypertension Mother     Diabetes Mother       Social History     Tobacco Use    Smoking status: Never Smoker    Smokeless tobacco: Never Used   Substance Use Topics    Alcohol use: Not Currently    Drug use: Never        Review of Systems   Constitutional: Negative for fatigue and fever.   HENT: Negative for nasal congestion and sore throat.    Respiratory: Negative for cough, shortness of breath and wheezing.    Cardiovascular: Negative for chest pain and palpitations.   Gastrointestinal: Negative for abdominal pain and blood in stool.   Genitourinary: Negative for dysuria.   Musculoskeletal: Negative for back pain and neck pain.   Integumentary:  Negative for rash and mole/lesion.   Neurological: Negative for dizziness, headaches and memory loss.   Psychiatric/Behavioral: Negative for agitation. The patient is not nervous/anxious.         Objective:  /76 (BP Location: Left arm, Patient Position: Sitting)   Pulse 100   Temp 97.7 °F (36.5 °C) (Temporal)   Resp 16   Wt 85.1 kg (187 lb 9.6 oz)   SpO2 99%   BMI 25.44 kg/m²      Physical Exam  Constitutional:       Appearance: Normal appearance.   HENT:      Head: Normocephalic and atraumatic.      Right Ear: External ear normal.      Left Ear: External ear normal.      Nose: Nose normal.   Eyes:      Extraocular Movements: Extraocular movements intact.      Conjunctiva/sclera: Conjunctivae normal.      Pupils: Pupils are equal, round, and reactive to light.   Cardiovascular:      Rate and Rhythm: Normal rate and regular rhythm.      Pulses: Normal pulses.      Heart sounds: Normal heart sounds. No murmur heard.  No friction rub. No gallop.    Pulmonary:      Effort: Pulmonary effort is normal.      Breath sounds: No wheezing, rhonchi or rales.   Abdominal:      General:  Abdomen is flat.      Palpations: Abdomen is soft.   Musculoskeletal:      Cervical back: Normal range of motion and neck supple.      Right lower leg: No edema.      Left lower leg: No edema.   Skin:     General: Skin is warm and dry.      Findings: No rash.   Neurological:      General: No focal deficit present.      Mental Status: He is alert and oriented to person, place, and time.      Cranial Nerves: No cranial nerve deficit.   Psychiatric:         Mood and Affect: Mood normal.          Assessment and Plan    Type 2 diabetes mellitus without complication, without long-term current use of insulin  -     glimepiride (AMARYL) 4 MG tablet; Take 0.5 tablets (2 mg total) by mouth 2 (two) times daily.  Dispense: 90 tablet; Refill: 1  -     JANUVIA 100 mg Tab; TAKE ONE (1) TABLET (100 MILLIGRAM TOTAL) BY MOUTH ONCE DAILY.  Dispense: 90 tablet; Refill: 1  -     metFORMIN (GLUCOPHAGE) 1000 MG tablet; Take 1 tablet (1,000 mg total) by mouth 2 (two) times daily with meals.  Dispense: 180 tablet; Refill: 1  -     Hemoglobin A1C; Future; Expected date: 02/11/2022  -     Microalbumin/Creatinine Ratio, Urine    Coronary artery disease involving native coronary artery of native heart without angina pectoris  -     ELIQUIS 5 mg Tab; Take 1 tablet (5 mg total) by mouth 2 (two) times daily.  Dispense: 180 tablet; Refill: 1    Hyperlipidemia, unspecified hyperlipidemia type  -     gemfibroziL (LOPID) 600 MG tablet; Take 1 tablet (600 mg total) by mouth 2 (two) times a day.  Dispense: 180 tablet; Refill: 1  -     Lipid Panel; Future; Expected date: 02/11/2022    Panlobular emphysema  -     fluticasone-umeclidin-vilanter (TRELEGY ELLIPTA) 100-62.5-25 mcg DsDv; INHALE 1 PUFF BY MOUTH DAILY FOR BREATHING  Dispense: 3 each; Refill: 1    Encounter for hepatitis C screening test for low risk patient  -     Hepatitis C Antibody; Future; Expected date: 02/11/2022    H/O iron deficiency anemia  -     CBC Auto Differential; Future; Expected  date: 02/11/2022         Problem List Items Addressed This Visit        Pulmonary    COPD (chronic obstructive pulmonary disease)    Relevant Medications    fluticasone-umeclidin-vilanter (TRELEGY ELLIPTA) 100-62.5-25 mcg DsDv       Cardiac/Vascular    Hyperlipidemia    Relevant Medications    gemfibroziL (LOPID) 600 MG tablet    Other Relevant Orders    Lipid Panel    Coronary artery disease    Overview     Very mild CAD by [facility] 8/31/2015         Relevant Medications    ELIQUIS 5 mg Tab       Oncology    H/O iron deficiency anemia    Relevant Orders    CBC Auto Differential       Endocrine    Diabetes mellitus, type 2 - Primary    Relevant Medications    glimepiride (AMARYL) 4 MG tablet    JANUVIA 100 mg Tab    metFORMIN (GLUCOPHAGE) 1000 MG tablet    Other Relevant Orders    Hemoglobin A1C    Microalbumin/Creatinine Ratio, Urine      Other Visit Diagnoses     Encounter for hepatitis C screening test for low risk patient        Relevant Orders    Hepatitis C Antibody         1-DMII-a1c  2-check lipids-may have to try a statin again-he had elevated liver enzymes at one point in the past, may stop the lopid  3-he wants the Covid booster in 1-2 months  4-Breathing has been doing well  Follow up in about 3 months (around 5/11/2022).

## 2022-02-15 DIAGNOSIS — E78.5 HYPERLIPIDEMIA, UNSPECIFIED HYPERLIPIDEMIA TYPE: Primary | ICD-10-CM

## 2022-02-15 RX ORDER — ATORVASTATIN CALCIUM 10 MG/1
10 TABLET, FILM COATED ORAL NIGHTLY
COMMUNITY
End: 2022-02-15 | Stop reason: SDUPTHER

## 2022-02-15 RX ORDER — ATORVASTATIN CALCIUM 10 MG/1
10 TABLET, FILM COATED ORAL NIGHTLY
Qty: 90 TABLET | Refills: 1 | Status: SHIPPED | OUTPATIENT
Start: 2022-02-15 | End: 2022-08-12 | Stop reason: SDUPTHER

## 2022-02-15 NOTE — TELEPHONE ENCOUNTER
----- Message from Shivam Payne MD sent at 2/15/2022  7:25 AM CST -----  Stop lopid and start Lipitor 10mg qd and check lipid liver 3 months

## 2022-03-11 DIAGNOSIS — Z71.89 COMPLEX CARE COORDINATION: ICD-10-CM

## 2022-04-20 ENCOUNTER — HOSPITAL ENCOUNTER (OUTPATIENT)
Dept: CARDIOLOGY | Facility: HOSPITAL | Age: 67
Discharge: HOME | End: 2022-04-20
Attending: INTERNAL MEDICINE
Payer: MEDICARE

## 2022-04-20 DIAGNOSIS — Z95.0 PRESENCE OF CARDIAC PACEMAKER: Primary | ICD-10-CM

## 2022-04-20 DIAGNOSIS — Z95.0 PRESENCE OF CARDIAC PACEMAKER: ICD-10-CM

## 2022-04-20 PROCEDURE — 93296 REM INTERROG EVL PM/IDS: CPT

## 2022-04-20 PROCEDURE — 93294 CARDIAC DEVICE CHECK - REMOTE: ICD-10-PCS | Mod: ,,, | Performed by: INTERNAL MEDICINE

## 2022-04-20 PROCEDURE — 93294 REM INTERROG EVL PM/LDLS PM: CPT | Mod: ,,, | Performed by: INTERNAL MEDICINE

## 2022-05-10 PROBLEM — I44.2 COMPLETE AV BLOCK: Chronic | Status: ACTIVE | Noted: 2021-07-28

## 2022-05-13 ENCOUNTER — OFFICE VISIT (OUTPATIENT)
Dept: FAMILY MEDICINE | Facility: CLINIC | Age: 67
End: 2022-05-13
Payer: MEDICARE

## 2022-05-13 VITALS
WEIGHT: 187 LBS | SYSTOLIC BLOOD PRESSURE: 138 MMHG | OXYGEN SATURATION: 99 % | RESPIRATION RATE: 16 BRPM | BODY MASS INDEX: 25.36 KG/M2 | TEMPERATURE: 97 F | HEART RATE: 100 BPM | DIASTOLIC BLOOD PRESSURE: 82 MMHG

## 2022-05-13 DIAGNOSIS — K75.81 NASH (NONALCOHOLIC STEATOHEPATITIS): ICD-10-CM

## 2022-05-13 DIAGNOSIS — E78.5 HYPERLIPIDEMIA, UNSPECIFIED HYPERLIPIDEMIA TYPE: ICD-10-CM

## 2022-05-13 DIAGNOSIS — I10 HYPERTENSION, UNSPECIFIED TYPE: ICD-10-CM

## 2022-05-13 DIAGNOSIS — E11.9 TYPE 2 DIABETES MELLITUS WITHOUT COMPLICATION, WITHOUT LONG-TERM CURRENT USE OF INSULIN: Primary | ICD-10-CM

## 2022-05-13 LAB
ALBUMIN SERPL BCP-MCNC: 4.2 G/DL (ref 3.5–5)
ALP SERPL-CCNC: 57 U/L (ref 45–115)
ALT SERPL W P-5'-P-CCNC: 34 U/L (ref 16–61)
AST SERPL W P-5'-P-CCNC: 23 U/L (ref 15–37)
BILIRUB DIRECT SERPL-MCNC: 0.2 MG/DL (ref 0–0.2)
BILIRUB SERPL-MCNC: 0.4 MG/DL (ref 0–1.2)
CHOLEST SERPL-MCNC: 127 MG/DL (ref 0–200)
CHOLEST/HDLC SERPL: 5.1 {RATIO}
EST. AVERAGE GLUCOSE BLD GHB EST-MCNC: 144 MG/DL
HBA1C MFR BLD HPLC: 6.9 % (ref 4.5–6.6)
HDLC SERPL-MCNC: 25 MG/DL (ref 40–60)
LDLC SERPL CALC-MCNC: 39 MG/DL
LDLC/HDLC SERPL: 1.6 {RATIO}
NONHDLC SERPL-MCNC: 102 MG/DL
PROT SERPL-MCNC: 8 G/DL (ref 6.4–8.2)
TRIGL SERPL-MCNC: 317 MG/DL (ref 35–150)
VLDLC SERPL-MCNC: 63 MG/DL

## 2022-05-13 PROCEDURE — 99214 OFFICE O/P EST MOD 30 MIN: CPT | Mod: ,,, | Performed by: INTERNAL MEDICINE

## 2022-05-13 PROCEDURE — 99214 PR OFFICE/OUTPT VISIT, EST, LEVL IV, 30-39 MIN: ICD-10-PCS | Mod: ,,, | Performed by: INTERNAL MEDICINE

## 2022-05-13 PROCEDURE — 80076 HEPATIC FUNCTION PANEL: CPT | Mod: ,,, | Performed by: CLINICAL MEDICAL LABORATORY

## 2022-05-13 PROCEDURE — 80076 HEPATIC FUNCTION PANEL: ICD-10-PCS | Mod: ,,, | Performed by: CLINICAL MEDICAL LABORATORY

## 2022-05-13 PROCEDURE — 83036 HEMOGLOBIN GLYCOSYLATED A1C: CPT | Mod: ,,, | Performed by: CLINICAL MEDICAL LABORATORY

## 2022-05-13 PROCEDURE — 83036 HEMOGLOBIN A1C: ICD-10-PCS | Mod: ,,, | Performed by: CLINICAL MEDICAL LABORATORY

## 2022-05-13 PROCEDURE — 80061 LIPID PANEL: CPT | Mod: ,,, | Performed by: CLINICAL MEDICAL LABORATORY

## 2022-05-13 PROCEDURE — 80061 LIPID PANEL: ICD-10-PCS | Mod: ,,, | Performed by: CLINICAL MEDICAL LABORATORY

## 2022-05-13 RX ORDER — MULTIVITAMIN
1 TABLET ORAL DAILY
COMMUNITY

## 2022-05-13 RX ORDER — ZINC GLUCONATE 50 MG
50 TABLET ORAL DAILY
COMMUNITY

## 2022-05-13 RX ORDER — CARVEDILOL 25 MG/1
TABLET ORAL
Qty: 180 TABLET | Refills: 1 | Status: SHIPPED | OUTPATIENT
Start: 2022-05-13 | End: 2023-01-20

## 2022-05-13 NOTE — PROGRESS NOTES
New Clinic Note    Patient Name:  Pro Aguirre is a 67 y.o. male     Chief Complaint:    Chief Complaint   Patient presents with    Follow-up     Patient is here for his checkup today.         Subjective  HPI         Current Outpatient Medications:     aspirin (ECOTRIN) 81 MG EC tablet, Take 81 mg by mouth once daily., Disp: , Rfl:     atorvastatin (LIPITOR) 10 MG tablet, Take 1 tablet (10 mg total) by mouth every evening., Disp: 90 tablet, Rfl: 1    ELIQUIS 5 mg Tab, Take 1 tablet (5 mg total) by mouth 2 (two) times daily., Disp: 180 tablet, Rfl: 1    FARXIGA 10 mg tablet, Take 1 tablet (10 mg total) by mouth once daily., Disp: 90 tablet, Rfl: 1    ferrous sulfate (FEOSOL) 325 mg (65 mg iron) Tab tablet, Take 1 tablet (325 mg total) by mouth daily with breakfast. (Patient taking differently: Take 325 mg by mouth once daily.), Disp: 270 tablet, Rfl: 1    fluticasone-umeclidin-vilanter (TRELEGY ELLIPTA) 100-62.5-25 mcg DsDv, INHALE 1 PUFF BY MOUTH DAILY FOR BREATHING, Disp: 3 each, Rfl: 1    glimepiride (AMARYL) 4 MG tablet, Take 0.5 tablets (2 mg total) by mouth 2 (two) times daily., Disp: 90 tablet, Rfl: 1    JANUVIA 100 mg Tab, TAKE ONE (1) TABLET (100 MILLIGRAM TOTAL) BY MOUTH ONCE DAILY., Disp: 90 tablet, Rfl: 1    metFORMIN (GLUCOPHAGE) 1000 MG tablet, Take 1 tablet (1,000 mg total) by mouth 2 (two) times daily with meals., Disp: 180 tablet, Rfl: 1    multivitamin (ONE DAILY MULTIVITAMIN) per tablet, Take 1 tablet by mouth once daily., Disp: , Rfl:     zinc gluconate 50 mg tablet, Take 50 mg by mouth once daily., Disp: , Rfl:     carvediloL (COREG) 25 MG tablet, TAKE ONE (1) TABLET BY MOUTH TWICE DAILY FOR BLOOD PRESSURE, Disp: 180 tablet, Rfl: 1   Past Medical History:   Diagnosis Date    ARF (acute renal failure)     Atrial fibrillation     Carotid artery occlusion     normal dopplers 8/20    COPD (chronic obstructive pulmonary disease)     Coronary artery disease     Diabetes  mellitus, type 2     GERD (gastroesophageal reflux disease)     H/O iron deficiency anemia     History of pericarditis     Hyperlipidemia     Hypertension     GARCÍA (nonalcoholic steatohepatitis)       Past Surgical History:   Procedure Laterality Date    APPENDECTOMY      TONSILLECTOMY        Family History   Problem Relation Age of Onset    Hypertension Mother     Diabetes Mother       Social History     Tobacco Use    Smoking status: Never Smoker    Smokeless tobacco: Never Used   Substance Use Topics    Alcohol use: Not Currently    Drug use: Never        Review of Systems   Constitutional: Negative for fatigue and fever.   HENT: Negative for nasal congestion and sore throat.    Respiratory: Negative for cough, shortness of breath and wheezing.    Cardiovascular: Negative for chest pain and palpitations.   Gastrointestinal: Negative for abdominal pain and blood in stool.   Genitourinary: Negative for dysuria.   Musculoskeletal: Negative for back pain and neck pain.   Integumentary:  Negative for rash and mole/lesion.   Neurological: Negative for dizziness, headaches and memory loss.   Psychiatric/Behavioral: Negative for agitation. The patient is not nervous/anxious.         Objective:  /82 (BP Location: Left arm, Patient Position: Sitting)   Pulse 100   Temp 96.8 °F (36 °C) (Temporal)   Resp 16   Wt 84.8 kg (187 lb)   SpO2 99%   BMI 25.36 kg/m²      Physical Exam  Constitutional:       Appearance: Normal appearance.   HENT:      Head: Normocephalic and atraumatic.      Right Ear: External ear normal.      Left Ear: External ear normal.      Nose: Nose normal.   Eyes:      Extraocular Movements: Extraocular movements intact.      Conjunctiva/sclera: Conjunctivae normal.      Pupils: Pupils are equal, round, and reactive to light.   Cardiovascular:      Rate and Rhythm: Normal rate and regular rhythm.      Pulses: Normal pulses.      Heart sounds: Normal heart sounds. No murmur heard.     No friction rub. No gallop.   Pulmonary:      Effort: Pulmonary effort is normal.      Breath sounds: No wheezing, rhonchi or rales.   Abdominal:      General: Abdomen is flat.      Palpations: Abdomen is soft.   Musculoskeletal:      Cervical back: Normal range of motion and neck supple.      Right lower leg: No edema.      Left lower leg: No edema.   Skin:     General: Skin is warm and dry.      Findings: No rash.   Neurological:      General: No focal deficit present.      Mental Status: He is alert and oriented to person, place, and time.      Cranial Nerves: No cranial nerve deficit.   Psychiatric:         Mood and Affect: Mood normal.          Assessment and Plan    Type 2 diabetes mellitus without complication, without long-term current use of insulin  -     Hemoglobin A1C; Future; Expected date: 05/13/2022    Hypertension, unspecified type  -     carvediloL (COREG) 25 MG tablet; TAKE ONE (1) TABLET BY MOUTH TWICE DAILY FOR BLOOD PRESSURE  Dispense: 180 tablet; Refill: 1    Hyperlipidemia, unspecified hyperlipidemia type  -     Lipid Panel; Future; Expected date: 05/13/2022    GARCÍA (nonalcoholic steatohepatitis)  -     Hepatic Function Panel; Future; Expected date: 05/13/2022         Problem List Items Addressed This Visit        Cardiac/Vascular    Hypertension (Chronic)    Relevant Medications    carvediloL (COREG) 25 MG tablet    Hyperlipidemia (Chronic)    Relevant Orders    Lipid Panel       Endocrine    Diabetes mellitus, type 2 - Primary (Chronic)    Relevant Orders    Hemoglobin A1C       GI    GARCÍA (nonalcoholic steatohepatitis) (Chronic)    Relevant Orders    Hepatic Function Panel       1-Needs a1c  2-we started lipitor last time-check lipid and liver, he had elevated liver enzymes in the past secondary to GARCÍA.  He has lost 4 pound    Follow up in about 3 months (around 8/13/2022).

## 2022-08-09 ENCOUNTER — HOSPITAL ENCOUNTER (OUTPATIENT)
Dept: CARDIOLOGY | Facility: HOSPITAL | Age: 67
Discharge: HOME | End: 2022-08-09
Attending: INTERNAL MEDICINE
Payer: MEDICARE

## 2022-08-09 ENCOUNTER — OFFICE VISIT (OUTPATIENT)
Dept: CARDIOLOGY | Facility: CLINIC | Age: 67
End: 2022-08-09
Payer: MEDICARE

## 2022-08-09 VITALS
HEART RATE: 86 BPM | HEIGHT: 72 IN | OXYGEN SATURATION: 98 % | WEIGHT: 186 LBS | DIASTOLIC BLOOD PRESSURE: 72 MMHG | BODY MASS INDEX: 25.19 KG/M2 | SYSTOLIC BLOOD PRESSURE: 118 MMHG

## 2022-08-09 DIAGNOSIS — Z95.0 PRESENCE OF CARDIAC PACEMAKER: ICD-10-CM

## 2022-08-09 DIAGNOSIS — I48.91 ATRIAL FIBRILLATION, UNSPECIFIED TYPE: Primary | ICD-10-CM

## 2022-08-09 PROCEDURE — 93280 PM DEVICE PROGR EVAL DUAL: CPT

## 2022-08-09 PROCEDURE — 93010 EKG 12-LEAD: ICD-10-PCS | Mod: S$PBB,,, | Performed by: INTERNAL MEDICINE

## 2022-08-09 PROCEDURE — 99214 OFFICE O/P EST MOD 30 MIN: CPT | Mod: S$PBB,,, | Performed by: NURSE PRACTITIONER

## 2022-08-09 PROCEDURE — 99214 PR OFFICE/OUTPT VISIT, EST, LEVL IV, 30-39 MIN: ICD-10-PCS | Mod: S$PBB,,, | Performed by: NURSE PRACTITIONER

## 2022-08-09 PROCEDURE — 93005 ELECTROCARDIOGRAM TRACING: CPT | Mod: PBBFAC | Performed by: INTERNAL MEDICINE

## 2022-08-09 PROCEDURE — 93010 ELECTROCARDIOGRAM REPORT: CPT | Mod: S$PBB,,, | Performed by: INTERNAL MEDICINE

## 2022-08-09 PROCEDURE — 93280 CARDIAC DEVICE CHECK - IN CLINIC & HOSPITAL: ICD-10-PCS | Mod: 26,,, | Performed by: INTERNAL MEDICINE

## 2022-08-09 PROCEDURE — 93280 PM DEVICE PROGR EVAL DUAL: CPT | Mod: 26,,, | Performed by: INTERNAL MEDICINE

## 2022-08-09 PROCEDURE — 99214 OFFICE O/P EST MOD 30 MIN: CPT | Mod: PBBFAC | Performed by: NURSE PRACTITIONER

## 2022-08-09 NOTE — PROGRESS NOTES
[facility] note        DATE OF SERVICE: 08/09/2022       PCP: Shivam Payne MD      CHIEF COMPLAINT:   Chief Complaint   Patient presents with    Follow-up     Patient denies any cardiac complaints today.        HISTORY OF PRESENT ILLNESS:  Pro Aguirre is a 67 y.o. male with a PMH of   Past Medical History:   Diagnosis Date    ARF (acute renal failure)     Atrial fibrillation     Carotid artery occlusion     normal dopplers 8/20    COPD (chronic obstructive pulmonary disease)     Coronary artery disease     Diabetes mellitus, type 2     GERD (gastroesophageal reflux disease)     H/O iron deficiency anemia     History of pericarditis     Hyperlipidemia     Hypertension     GARCÍA (nonalcoholic steatohepatitis)      who presents for routine follow up.  Chief Complaint   Patient presents with    Follow-up     Patient denies any cardiac complaints today.            PAST MEDICAL HISTORY:  Past Medical History:   Diagnosis Date    ARF (acute renal failure)     Atrial fibrillation     Carotid artery occlusion     normal dopplers 8/20    COPD (chronic obstructive pulmonary disease)     Coronary artery disease     Diabetes mellitus, type 2     GERD (gastroesophageal reflux disease)     H/O iron deficiency anemia     History of pericarditis     Hyperlipidemia     Hypertension     GARCÍA (nonalcoholic steatohepatitis)        PAST SURGICAL HISTORY:  Past Surgical History:   Procedure Laterality Date    APPENDECTOMY      TONSILLECTOMY         SOCIAL HISTORY:  Social History     Socioeconomic History    Marital status:    Tobacco Use    Smoking status: Never Smoker    Smokeless tobacco: Never Used   Substance and Sexual Activity    Alcohol use: Not Currently    Drug use: Never       FAMILY HISTORY:  Family History   Problem Relation Age of Onset    Hypertension Mother     Diabetes Mother          ALLERGIES:  Review of patient's allergies indicates:  No Known  Allergies     MEDICATIONS:    Current Outpatient Medications:     aspirin (ECOTRIN) 81 MG EC tablet, Take 81 mg by mouth once daily., Disp: , Rfl:     atorvastatin (LIPITOR) 10 MG tablet, Take 1 tablet (10 mg total) by mouth every evening., Disp: 90 tablet, Rfl: 1    carvediloL (COREG) 25 MG tablet, TAKE ONE (1) TABLET BY MOUTH TWICE DAILY FOR BLOOD PRESSURE, Disp: 180 tablet, Rfl: 1    ELIQUIS 5 mg Tab, Take 1 tablet (5 mg total) by mouth 2 (two) times daily., Disp: 180 tablet, Rfl: 1    FARXIGA 10 mg tablet, TAKE ONE (1) TABLET (10 MILLIGRAM TOTAL) BY MOUTH ONCE DAILY., Disp: 90 tablet, Rfl: 1    ferrous sulfate (FEOSOL) 325 mg (65 mg iron) Tab tablet, Take 1 tablet (325 mg total) by mouth daily with breakfast. (Patient taking differently: Take 325 mg by mouth once daily.), Disp: 270 tablet, Rfl: 1    fluticasone-umeclidin-vilanter (TRELEGY ELLIPTA) 100-62.5-25 mcg DsDv, INHALE 1 PUFF BY MOUTH DAILY FOR BREATHING, Disp: 3 each, Rfl: 1    glimepiride (AMARYL) 4 MG tablet, Take 0.5 tablets (2 mg total) by mouth 2 (two) times daily., Disp: 90 tablet, Rfl: 1    JANUVIA 100 mg Tab, TAKE ONE (1) TABLET (100 MILLIGRAM TOTAL) BY MOUTH ONCE DAILY., Disp: 90 tablet, Rfl: 1    metFORMIN (GLUCOPHAGE) 1000 MG tablet, Take 1 tablet (1,000 mg total) by mouth 2 (two) times daily with meals., Disp: 180 tablet, Rfl: 1    multivitamin (THERAGRAN) per tablet, Take 1 tablet by mouth once daily., Disp: , Rfl:     zinc gluconate 50 mg tablet, Take 50 mg by mouth once daily., Disp: , Rfl:   Medications have been reviewed and reconciled.     PHYSICAL EXAM:  /72 (BP Location: Left arm, Patient Position: Sitting)   Pulse 86   Ht 6' (1.829 m)   Wt 84.4 kg (186 lb)   SpO2 98%   BMI 25.23 kg/m²   Wt Readings from Last 3 Encounters:   08/09/22 84.4 kg (186 lb)   05/13/22 84.8 kg (187 lb)   02/11/22 85.1 kg (187 lb 9.6 oz)      Body mass index is 25.23 kg/m².    Physical Exam  Vitals and nursing note reviewed.    Constitutional:       Appearance: Normal appearance. He is normal weight.   HENT:      Head: Normocephalic and atraumatic.   Eyes:      Pupils: Pupils are equal, round, and reactive to light.   Neck:      Vascular: No carotid bruit.   Cardiovascular:      Rate and Rhythm: Normal rate and regular rhythm.      Pulses: Normal pulses.      Heart sounds: Normal heart sounds.      Comments: Well healed ppm site to left upper chest wall  Abdominal:      General: Bowel sounds are normal.   Musculoskeletal:      Cervical back: Neck supple.      Right lower leg: No edema.      Left lower leg: No edema.   Skin:     General: Skin is dry.      Capillary Refill: Capillary refill takes less than 2 seconds.   Neurological:      General: No focal deficit present.      Mental Status: He is alert and oriented to person, place, and time.   Psychiatric:         Mood and Affect: Mood normal.         Behavior: Behavior normal.         LABS REVIEWED:  Lab Results   Component Value Date    WBC 5.44 02/11/2022    RBC 4.48 (L) 02/11/2022    HGB 13.3 (L) 02/11/2022    HCT 41.1 02/11/2022    MCV 91.7 02/11/2022    MCH 29.7 02/11/2022    MCHC 32.4 02/11/2022    RDW 14.6 (H) 02/11/2022     02/11/2022    MPV 11.1 02/11/2022    NRBC 0.0 02/11/2022     Lab Results   Component Value Date     (L) 08/11/2021    K 4.9 08/11/2021     08/11/2021    CO2 22 08/11/2021    BUN 27 (H) 08/11/2021     Lab Results   Component Value Date    AST 23 05/13/2022    ALT 34 05/13/2022     Lab Results   Component Value Date     (H) 08/11/2021    HGBA1C 6.9 (H) 05/13/2022     Lab Results   Component Value Date    CHOL 127 05/13/2022    HDL 25 (L) 05/13/2022    TRIG 317 (H) 05/13/2022    CHOLHDL 5.1 05/13/2022       CARDIAC STUDIES REVIEWED:EKG:a paced v sensed; rhythm; RBBB; HR 85 bpm; old inferior q waves; no significant change from EKG done 2/2/2021        ASSESSMENT:   Patient Active Problem List   Diagnosis    Complete AV block     Hypertension    Hyperlipidemia    Coronary artery disease    Atrial fibrillation    GERD (gastroesophageal reflux disease)    COPD (chronic obstructive pulmonary disease)    H/O iron deficiency anemia    GARCÍA (nonalcoholic steatohepatitis)    Diabetes mellitus, type 2    History of pericarditis            Problem List Items Addressed This Visit        Cardiac/Vascular    Atrial fibrillation - Primary (Chronic)    Overview     Paroxysmal; cva prophylaxis with Eliquis           Relevant Orders    EKG 12-lead           PLAN:  Orders Placed This Encounter   Procedures    EKG 12-lead     Standing Status:   Future     Standing Expiration Date:   8/9/2023      RTC: 6 months

## 2022-08-12 ENCOUNTER — OFFICE VISIT (OUTPATIENT)
Dept: FAMILY MEDICINE | Facility: CLINIC | Age: 67
End: 2022-08-12
Payer: MEDICARE

## 2022-08-12 VITALS
RESPIRATION RATE: 16 BRPM | HEART RATE: 91 BPM | DIASTOLIC BLOOD PRESSURE: 78 MMHG | OXYGEN SATURATION: 97 % | TEMPERATURE: 97 F | BODY MASS INDEX: 25.33 KG/M2 | SYSTOLIC BLOOD PRESSURE: 132 MMHG | HEIGHT: 72 IN | WEIGHT: 187 LBS

## 2022-08-12 DIAGNOSIS — Z12.5 SCREENING PSA (PROSTATE SPECIFIC ANTIGEN): ICD-10-CM

## 2022-08-12 DIAGNOSIS — I10 PRIMARY HYPERTENSION: ICD-10-CM

## 2022-08-12 DIAGNOSIS — Z86.2 H/O IRON DEFICIENCY ANEMIA: ICD-10-CM

## 2022-08-12 DIAGNOSIS — E11.9 TYPE 2 DIABETES MELLITUS WITHOUT COMPLICATION, WITHOUT LONG-TERM CURRENT USE OF INSULIN: Primary | ICD-10-CM

## 2022-08-12 DIAGNOSIS — M79.642 LEFT HAND PAIN: ICD-10-CM

## 2022-08-12 DIAGNOSIS — I25.10 CORONARY ARTERY DISEASE INVOLVING NATIVE CORONARY ARTERY OF NATIVE HEART WITHOUT ANGINA PECTORIS: ICD-10-CM

## 2022-08-12 DIAGNOSIS — E78.5 HYPERLIPIDEMIA, UNSPECIFIED HYPERLIPIDEMIA TYPE: ICD-10-CM

## 2022-08-12 LAB
ANION GAP SERPL CALCULATED.3IONS-SCNC: 14 MMOL/L (ref 7–16)
BATTERY VOLTAGE (V): 2.97 V
BUN SERPL-MCNC: 33 MG/DL (ref 7–18)
BUN/CREAT SERPL: 29 (ref 6–20)
CALCIUM SERPL-MCNC: 9.8 MG/DL (ref 8.5–10.1)
CHLORIDE SERPL-SCNC: 97 MMOL/L (ref 98–107)
CO2 SERPL-SCNC: 26 MMOL/L (ref 21–32)
CREAT SERPL-MCNC: 1.14 MG/DL (ref 0.7–1.3)
EGFR (NO RACE VARIABLE) (RUSH/TITUS): 70 ML/MIN/1.73M²
EST. AVERAGE GLUCOSE BLD GHB EST-MCNC: 144 MG/DL
GLUCOSE SERPL-MCNC: 172 MG/DL (ref 74–106)
HBA1C MFR BLD HPLC: 6.9 % (ref 4.5–6.6)
OHS CV DC PP MS1: 0.4 MS
OHS CV DC PP MS2: 0.4 MS
OHS CV DC PP V1: 1.5 V
OHS CV DC PP V2: 2 V
POTASSIUM SERPL-SCNC: 5.4 MMOL/L (ref 3.5–5.1)
PSA SERPL-MCNC: 0.22 NG/ML (ref 0–4.4)
SODIUM SERPL-SCNC: 132 MMOL/L (ref 136–145)

## 2022-08-12 PROCEDURE — 99214 PR OFFICE/OUTPT VISIT, EST, LEVL IV, 30-39 MIN: ICD-10-PCS | Mod: ,,, | Performed by: INTERNAL MEDICINE

## 2022-08-12 PROCEDURE — 80048 BASIC METABOLIC PNL TOTAL CA: CPT | Mod: ,,, | Performed by: CLINICAL MEDICAL LABORATORY

## 2022-08-12 PROCEDURE — 99214 OFFICE O/P EST MOD 30 MIN: CPT | Mod: ,,, | Performed by: INTERNAL MEDICINE

## 2022-08-12 PROCEDURE — 83036 HEMOGLOBIN GLYCOSYLATED A1C: CPT | Mod: ,,, | Performed by: CLINICAL MEDICAL LABORATORY

## 2022-08-12 PROCEDURE — 83036 HEMOGLOBIN A1C: ICD-10-PCS | Mod: ,,, | Performed by: CLINICAL MEDICAL LABORATORY

## 2022-08-12 PROCEDURE — G0103 PSA SCREENING: HCPCS | Mod: ,,, | Performed by: CLINICAL MEDICAL LABORATORY

## 2022-08-12 PROCEDURE — 80048 BASIC METABOLIC PANEL: ICD-10-PCS | Mod: ,,, | Performed by: CLINICAL MEDICAL LABORATORY

## 2022-08-12 PROCEDURE — G0103 PSA, SCREENING: ICD-10-PCS | Mod: ,,, | Performed by: CLINICAL MEDICAL LABORATORY

## 2022-08-12 RX ORDER — APIXABAN 5 MG/1
5 TABLET, FILM COATED ORAL 2 TIMES DAILY
Qty: 180 TABLET | Refills: 1 | Status: SHIPPED | OUTPATIENT
Start: 2022-08-12 | End: 2023-02-10 | Stop reason: SDUPTHER

## 2022-08-12 RX ORDER — GLIMEPIRIDE 4 MG/1
2 TABLET ORAL 2 TIMES DAILY
Qty: 90 TABLET | Refills: 1 | Status: SHIPPED | OUTPATIENT
Start: 2022-08-12 | End: 2022-12-27

## 2022-08-12 RX ORDER — SITAGLIPTIN 100 MG/1
TABLET, FILM COATED ORAL
Qty: 90 TABLET | Refills: 1 | Status: SHIPPED | OUTPATIENT
Start: 2022-08-12 | End: 2023-01-12

## 2022-08-12 RX ORDER — ATORVASTATIN CALCIUM 10 MG/1
10 TABLET, FILM COATED ORAL NIGHTLY
Qty: 90 TABLET | Refills: 1 | Status: SHIPPED | OUTPATIENT
Start: 2022-08-12 | End: 2022-11-11 | Stop reason: SDUPTHER

## 2022-08-12 RX ORDER — FERROUS SULFATE 325(65) MG
325 TABLET ORAL DAILY
Qty: 90 TABLET | Refills: 1 | Status: CANCELLED | OUTPATIENT
Start: 2022-08-12

## 2022-08-12 NOTE — PROGRESS NOTES
New Clinic Note    Patient Name:  Pro Aguirre is a 67 y.o. male     Chief Complaint:    Chief Complaint   Patient presents with    Follow-up     Patient is here for his checkup today.     Hand Pain     He reports a burning sensation that comes and goes between his left 4th and 5th fingers.     Colon Cancer Screening     He had his colonoscopy in 2020 and it was suggested that he repeat it in one year. He refuses scheduling at this time.         Subjective  HPI         Current Outpatient Medications:     aspirin (ECOTRIN) 81 MG EC tablet, Take 81 mg by mouth once daily., Disp: , Rfl:     carvediloL (COREG) 25 MG tablet, TAKE ONE (1) TABLET BY MOUTH TWICE DAILY FOR BLOOD PRESSURE, Disp: 180 tablet, Rfl: 1    FARXIGA 10 mg tablet, TAKE ONE (1) TABLET (10 MILLIGRAM TOTAL) BY MOUTH ONCE DAILY., Disp: 90 tablet, Rfl: 1    ferrous sulfate (FEOSOL) 325 mg (65 mg iron) Tab tablet, Take 1 tablet (325 mg total) by mouth daily with breakfast. (Patient taking differently: Take 325 mg by mouth once daily.), Disp: 270 tablet, Rfl: 1    fluticasone-umeclidin-vilanter (TRELEGY ELLIPTA) 100-62.5-25 mcg DsDv, INHALE 1 PUFF BY MOUTH DAILY FOR BREATHING, Disp: 3 each, Rfl: 1    metFORMIN (GLUCOPHAGE) 1000 MG tablet, Take 1 tablet (1,000 mg total) by mouth 2 (two) times daily with meals., Disp: 180 tablet, Rfl: 1    multivitamin (THERAGRAN) per tablet, Take 1 tablet by mouth once daily., Disp: , Rfl:     zinc gluconate 50 mg tablet, Take 50 mg by mouth once daily., Disp: , Rfl:     atorvastatin (LIPITOR) 10 MG tablet, Take 1 tablet (10 mg total) by mouth every evening., Disp: 90 tablet, Rfl: 1    ELIQUIS 5 mg Tab, Take 1 tablet (5 mg total) by mouth 2 (two) times daily., Disp: 180 tablet, Rfl: 1    glimepiride (AMARYL) 4 MG tablet, Take 0.5 tablets (2 mg total) by mouth 2 (two) times daily., Disp: 90 tablet, Rfl: 1    JANUVIA 100 mg Tab, TAKE ONE (1) TABLET (100 MILLIGRAM TOTAL) BY MOUTH ONCE DAILY., Disp: 90  tablet, Rfl: 1   Past Medical History:   Diagnosis Date    ARF (acute renal failure)     Atrial fibrillation     Carotid artery occlusion     normal dopplers 8/20    COPD (chronic obstructive pulmonary disease)     Coronary artery disease     Diabetes mellitus, type 2     GERD (gastroesophageal reflux disease)     H/O iron deficiency anemia     History of pericarditis     Hyperlipidemia     Hypertension     GARCÍA (nonalcoholic steatohepatitis)       Past Surgical History:   Procedure Laterality Date    APPENDECTOMY      TONSILLECTOMY        Family History   Problem Relation Age of Onset    Hypertension Mother     Diabetes Mother       Social History     Tobacco Use    Smoking status: Never Smoker    Smokeless tobacco: Never Used   Substance Use Topics    Alcohol use: Not Currently    Drug use: Never        Review of Systems   Constitutional: Negative for fatigue and fever.   HENT: Negative for nasal congestion and sore throat.    Respiratory: Negative for cough, shortness of breath and wheezing.    Cardiovascular: Negative for chest pain and palpitations.   Gastrointestinal: Negative for abdominal pain and blood in stool.   Genitourinary: Negative for dysuria.   Musculoskeletal: Negative for back pain and neck pain.   Integumentary:  Negative for rash and mole/lesion.   Neurological: Negative for dizziness, headaches and memory loss.   Psychiatric/Behavioral: Negative for agitation. The patient is not nervous/anxious.         Objective:  /78 (BP Location: Left arm, Patient Position: Sitting)   Pulse 91   Temp 97 °F (36.1 °C) (Temporal)   Resp 16   Ht 6' (1.829 m)   Wt 84.8 kg (187 lb)   SpO2 97%   BMI 25.36 kg/m²      Physical Exam  Constitutional:       Appearance: Normal appearance.   HENT:      Head: Normocephalic and atraumatic.      Right Ear: External ear normal.      Left Ear: External ear normal.      Nose: Nose normal.   Eyes:      Extraocular Movements: Extraocular movements  intact.      Conjunctiva/sclera: Conjunctivae normal.      Pupils: Pupils are equal, round, and reactive to light.   Cardiovascular:      Rate and Rhythm: Normal rate and regular rhythm.      Pulses: Normal pulses.      Heart sounds: Normal heart sounds. No murmur heard.    No friction rub. No gallop.   Pulmonary:      Effort: Pulmonary effort is normal.      Breath sounds: No wheezing, rhonchi or rales.   Abdominal:      General: Abdomen is flat.      Palpations: Abdomen is soft.   Musculoskeletal:      Cervical back: Normal range of motion and neck supple.      Right lower leg: No edema.      Left lower leg: No edema.   Skin:     General: Skin is warm and dry.      Findings: No rash.   Neurological:      General: No focal deficit present.      Mental Status: He is alert and oriented to person, place, and time.      Cranial Nerves: No cranial nerve deficit.   Psychiatric:         Mood and Affect: Mood normal.          Assessment and Plan    Type 2 diabetes mellitus without complication, without long-term current use of insulin  -     JANUVIA 100 mg Tab; TAKE ONE (1) TABLET (100 MILLIGRAM TOTAL) BY MOUTH ONCE DAILY.  Dispense: 90 tablet; Refill: 1  -     glimepiride (AMARYL) 4 MG tablet; Take 0.5 tablets (2 mg total) by mouth 2 (two) times daily.  Dispense: 90 tablet; Refill: 1  -     Hemoglobin A1C; Future; Expected date: 08/12/2022    Coronary artery disease involving native coronary artery of native heart without angina pectoris  -     ELIQUIS 5 mg Tab; Take 1 tablet (5 mg total) by mouth 2 (two) times daily.  Dispense: 180 tablet; Refill: 1    H/O iron deficiency anemia    Hyperlipidemia, unspecified hyperlipidemia type  -     atorvastatin (LIPITOR) 10 MG tablet; Take 1 tablet (10 mg total) by mouth every evening.  Dispense: 90 tablet; Refill: 1    Primary hypertension  -     Basic Metabolic Panel; Future; Expected date: 08/12/2022    Screening PSA (prostate specific antigen)  -     PSA, Screening; Future;  Expected date: 08/12/2022    Left hand pain         Problem List Items Addressed This Visit        Cardiac/Vascular    Hypertension (Chronic)    Relevant Orders    Basic Metabolic Panel    Hyperlipidemia (Chronic)    Relevant Medications    atorvastatin (LIPITOR) 10 MG tablet    Coronary artery disease (Chronic)    Overview     Very mild CAD by [facility] 8/31/2015           Relevant Medications    ELIQUIS 5 mg Tab       Oncology    H/O iron deficiency anemia (Chronic)       Endocrine    Diabetes mellitus, type 2 - Primary (Chronic)    Relevant Medications    JANUVIA 100 mg Tab    glimepiride (AMARYL) 4 MG tablet    Other Relevant Orders    Hemoglobin A1C      Other Visit Diagnoses     Screening PSA (prostate specific antigen)        Relevant Orders    PSA, Screening    Left hand pain           1-needs a1c, he thinks it's going to be high  2-he can stop the iron, last hct was normal  3-left hand pain, nothing acute will monitor  4-bp is good    Follow up in about 3 months (around 11/12/2022).

## 2022-08-31 ENCOUNTER — OFFICE VISIT (OUTPATIENT)
Dept: FAMILY MEDICINE | Facility: CLINIC | Age: 67
End: 2022-08-31
Payer: MEDICARE

## 2022-08-31 VITALS
DIASTOLIC BLOOD PRESSURE: 74 MMHG | TEMPERATURE: 97 F | HEART RATE: 85 BPM | SYSTOLIC BLOOD PRESSURE: 160 MMHG | RESPIRATION RATE: 16 BRPM | WEIGHT: 187 LBS | BODY MASS INDEX: 25.36 KG/M2 | OXYGEN SATURATION: 98 %

## 2022-08-31 DIAGNOSIS — Z20.822 COUGH WITH EXPOSURE TO COVID-19 VIRUS: ICD-10-CM

## 2022-08-31 DIAGNOSIS — R05.8 COUGH WITH EXPOSURE TO COVID-19 VIRUS: ICD-10-CM

## 2022-08-31 DIAGNOSIS — U07.1 COVID-19: Primary | ICD-10-CM

## 2022-08-31 LAB
CTP QC/QA: YES
FLUAV AG NPH QL: NEGATIVE
FLUBV AG NPH QL: NEGATIVE
SARS-COV-2 AG RESP QL IA.RAPID: POSITIVE

## 2022-08-31 PROCEDURE — 99213 PR OFFICE/OUTPT VISIT, EST, LEVL III, 20-29 MIN: ICD-10-PCS | Mod: CR,,, | Performed by: INTERNAL MEDICINE

## 2022-08-31 PROCEDURE — 99213 OFFICE O/P EST LOW 20 MIN: CPT | Mod: CR,,, | Performed by: INTERNAL MEDICINE

## 2022-08-31 PROCEDURE — 87428 SARSCOV & INF VIR A&B AG IA: CPT | Mod: RHCUB | Performed by: INTERNAL MEDICINE

## 2022-08-31 NOTE — PROGRESS NOTES
New Clinic Note    Patient Name:  Pro Aguirre is a 67 y.o. male     Chief Complaint:    Chief Complaint   Patient presents with    Sinus Problem     Patient c/o sinus congestion/drainage, runny nose, chills, dry cough, sore throat, and headache for four days. He was exposed to covid last week.     Did not bring meds        Subjective  HPI         Current Outpatient Medications:     aspirin (ECOTRIN) 81 MG EC tablet, Take 81 mg by mouth once daily., Disp: , Rfl:     atorvastatin (LIPITOR) 10 MG tablet, Take 1 tablet (10 mg total) by mouth every evening., Disp: 90 tablet, Rfl: 1    carvediloL (COREG) 25 MG tablet, TAKE ONE (1) TABLET BY MOUTH TWICE DAILY FOR BLOOD PRESSURE, Disp: 180 tablet, Rfl: 1    ELIQUIS 5 mg Tab, Take 1 tablet (5 mg total) by mouth 2 (two) times daily., Disp: 180 tablet, Rfl: 1    FARXIGA 10 mg tablet, TAKE ONE (1) TABLET (10 MILLIGRAM TOTAL) BY MOUTH ONCE DAILY., Disp: 90 tablet, Rfl: 1    ferrous sulfate (FEOSOL) 325 mg (65 mg iron) Tab tablet, Take 1 tablet (325 mg total) by mouth daily with breakfast. (Patient taking differently: Take 325 mg by mouth once daily.), Disp: 270 tablet, Rfl: 1    fluticasone-umeclidin-vilanter (TRELEGY ELLIPTA) 100-62.5-25 mcg DsDv, INHALE ONE (1) PUFF BY MOUTH DAILY FOR BREATHING, Disp: 1 each, Rfl: 5    glimepiride (AMARYL) 4 MG tablet, Take 0.5 tablets (2 mg total) by mouth 2 (two) times daily., Disp: 90 tablet, Rfl: 1    JANUVIA 100 mg Tab, TAKE ONE (1) TABLET (100 MILLIGRAM TOTAL) BY MOUTH ONCE DAILY., Disp: 90 tablet, Rfl: 1    metFORMIN (GLUCOPHAGE) 1000 MG tablet, Take 1 tablet (1,000 mg total) by mouth 2 (two) times daily with meals., Disp: 180 tablet, Rfl: 1    multivitamin (THERAGRAN) per tablet, Take 1 tablet by mouth once daily., Disp: , Rfl:     zinc gluconate 50 mg tablet, Take 50 mg by mouth once daily., Disp: , Rfl:    Past Medical History:   Diagnosis Date    ARF (acute renal failure)     Atrial fibrillation     Carotid artery  occlusion     normal dopplers 8/20    COPD (chronic obstructive pulmonary disease)     Coronary artery disease     Diabetes mellitus, type 2     GERD (gastroesophageal reflux disease)     H/O iron deficiency anemia     History of pericarditis     Hyperlipidemia     Hypertension     GARCÍA (nonalcoholic steatohepatitis)       Past Surgical History:   Procedure Laterality Date    APPENDECTOMY      TONSILLECTOMY        Family History   Problem Relation Age of Onset    Hypertension Mother     Diabetes Mother       Social History     Tobacco Use    Smoking status: Never    Smokeless tobacco: Never   Substance Use Topics    Alcohol use: Not Currently    Drug use: Never        Review of Systems   Constitutional:  Positive for fever. Negative for fatigue.   HENT:  Positive for nasal congestion, rhinorrhea and sinus pressure/congestion. Negative for sore throat.    Respiratory:  Positive for cough. Negative for shortness of breath and wheezing.    Cardiovascular:  Negative for chest pain and palpitations.   Gastrointestinal:  Negative for abdominal pain and blood in stool.   Genitourinary:  Negative for dysuria.   Musculoskeletal:  Negative for back pain and neck pain.   Integumentary:  Negative for rash and mole/lesion.   Neurological:  Positive for headaches. Negative for dizziness and memory loss.   Psychiatric/Behavioral:  Negative for agitation. The patient is not nervous/anxious.       Objective:  BP (!) 160/74 (BP Location: Left arm, Patient Position: Sitting)   Pulse 85   Temp 97 °F (36.1 °C) (Temporal)   Resp 16   Wt 84.8 kg (187 lb)   SpO2 98%   BMI 25.36 kg/m²      Physical Exam  Constitutional:       Appearance: Normal appearance.   HENT:      Head: Normocephalic and atraumatic.      Right Ear: External ear normal.      Left Ear: External ear normal.      Nose: Nose normal.   Eyes:      Extraocular Movements: Extraocular movements intact.      Conjunctiva/sclera: Conjunctivae normal.      Pupils: Pupils are  equal, round, and reactive to light.   Cardiovascular:      Rate and Rhythm: Normal rate.      Pulses: Normal pulses.   Pulmonary:      Effort: Pulmonary effort is normal.      Breath sounds: No wheezing, rhonchi or rales.   Abdominal:      General: Abdomen is flat.      Palpations: Abdomen is soft.   Musculoskeletal:      Cervical back: Normal range of motion and neck supple.      Right lower leg: No edema.      Left lower leg: No edema.   Skin:     General: Skin is warm and dry.      Findings: No rash.   Neurological:      General: No focal deficit present.      Mental Status: He is alert and oriented to person, place, and time.      Cranial Nerves: No cranial nerve deficit.   Psychiatric:         Mood and Affect: Mood normal.        Assessment and Plan    COVID-19    Cough with exposure to COVID-19 virus  -     POCT SARS-COV2 (COVID) with Flu Antigen       Problem List Items Addressed This Visit    None  Visit Diagnoses       COVID-19    -  Primary    Cough with exposure to COVID-19 virus        Relevant Orders    POCT SARS-COV2 (COVID) with Flu Antigen (Completed)           Covid Ag is positive, he's 5-6 days in with his symptoms, he feels much better today than he did,  no paxlovid at this time.  Poly Hist Forte for symptoms.  Follow up if symptoms worsen or fail to improve.

## 2022-10-03 LAB
LEFT EYE DM RETINOPATHY: NORMAL
RIGHT EYE DM RETINOPATHY: NORMAL

## 2022-10-09 DIAGNOSIS — Z71.89 COMPLEX CARE COORDINATION: ICD-10-CM

## 2022-11-10 ENCOUNTER — HOSPITAL ENCOUNTER (OUTPATIENT)
Dept: CARDIOLOGY | Facility: HOSPITAL | Age: 67
Discharge: HOME | End: 2022-11-10
Attending: INTERNAL MEDICINE
Payer: MEDICARE

## 2022-11-10 DIAGNOSIS — Z95.0 PRESENCE OF CARDIAC PACEMAKER: ICD-10-CM

## 2022-11-10 DIAGNOSIS — Z95.0 PRESENCE OF CARDIAC PACEMAKER: Primary | ICD-10-CM

## 2022-11-10 LAB
BATTERY VOLTAGE (V): 2.96 V
ERI (V): 2.83 V
OHS CV DC PP MS1: 0.4 MS
OHS CV DC PP MS2: 0.4 MS
OHS CV DC PP V1: 1.5 V
OHS CV DC PP V2: 2 V

## 2022-11-10 PROCEDURE — 93294 REM INTERROG EVL PM/LDLS PM: CPT | Mod: ,,, | Performed by: INTERNAL MEDICINE

## 2022-11-10 PROCEDURE — 93296 REM INTERROG EVL PM/IDS: CPT

## 2022-11-10 PROCEDURE — 93294 CARDIAC DEVICE CHECK - REMOTE: ICD-10-PCS | Mod: ,,, | Performed by: INTERNAL MEDICINE

## 2022-11-11 ENCOUNTER — OFFICE VISIT (OUTPATIENT)
Dept: FAMILY MEDICINE | Facility: CLINIC | Age: 67
End: 2022-11-11
Payer: MEDICARE

## 2022-11-11 VITALS
WEIGHT: 186 LBS | BODY MASS INDEX: 25.19 KG/M2 | HEIGHT: 72 IN | TEMPERATURE: 97 F | HEART RATE: 91 BPM | OXYGEN SATURATION: 98 % | DIASTOLIC BLOOD PRESSURE: 78 MMHG | SYSTOLIC BLOOD PRESSURE: 132 MMHG | RESPIRATION RATE: 16 BRPM

## 2022-11-11 DIAGNOSIS — I10 PRIMARY HYPERTENSION: Chronic | ICD-10-CM

## 2022-11-11 DIAGNOSIS — E11.9 TYPE 2 DIABETES MELLITUS WITHOUT COMPLICATION, WITHOUT LONG-TERM CURRENT USE OF INSULIN: Primary | Chronic | ICD-10-CM

## 2022-11-11 DIAGNOSIS — E78.5 HYPERLIPIDEMIA, UNSPECIFIED HYPERLIPIDEMIA TYPE: ICD-10-CM

## 2022-11-11 LAB
EST. AVERAGE GLUCOSE BLD GHB EST-MCNC: 134 MG/DL
HBA1C MFR BLD HPLC: 6.6 % (ref 4.5–6.6)

## 2022-11-11 PROCEDURE — 90694 VACC AIIV4 NO PRSRV 0.5ML IM: CPT | Mod: ,,, | Performed by: INTERNAL MEDICINE

## 2022-11-11 PROCEDURE — 83036 HEMOGLOBIN GLYCOSYLATED A1C: CPT | Mod: ,,, | Performed by: CLINICAL MEDICAL LABORATORY

## 2022-11-11 PROCEDURE — 90694 FLU VACCINE - QUADRIVALENT - ADJUVANTED: ICD-10-PCS | Mod: ,,, | Performed by: INTERNAL MEDICINE

## 2022-11-11 PROCEDURE — G0008 ADMIN INFLUENZA VIRUS VAC: HCPCS | Mod: ,,, | Performed by: INTERNAL MEDICINE

## 2022-11-11 PROCEDURE — G0008 FLU VACCINE - QUADRIVALENT - ADJUVANTED: ICD-10-PCS | Mod: ,,, | Performed by: INTERNAL MEDICINE

## 2022-11-11 PROCEDURE — 99214 OFFICE O/P EST MOD 30 MIN: CPT | Mod: ,,, | Performed by: INTERNAL MEDICINE

## 2022-11-11 PROCEDURE — 99214 PR OFFICE/OUTPT VISIT, EST, LEVL IV, 30-39 MIN: ICD-10-PCS | Mod: ,,, | Performed by: INTERNAL MEDICINE

## 2022-11-11 PROCEDURE — 83036 HEMOGLOBIN A1C: ICD-10-PCS | Mod: ,,, | Performed by: CLINICAL MEDICAL LABORATORY

## 2022-11-11 RX ORDER — ATORVASTATIN CALCIUM 10 MG/1
10 TABLET, FILM COATED ORAL NIGHTLY
Qty: 90 TABLET | Refills: 1 | Status: SHIPPED | OUTPATIENT
Start: 2022-11-11 | End: 2023-02-10 | Stop reason: SDUPTHER

## 2022-11-11 NOTE — PROGRESS NOTES
New Clinic Note    Patient Name:  Pro Aguirre is a 67 y.o. male     Chief Complaint:    Chief Complaint   Patient presents with    Follow-up     Patient is here for his checkup today.     Flu Vaccine     He wants his flu shot today.         Subjective  HPI         Current Outpatient Medications:     aspirin (ECOTRIN) 81 MG EC tablet, Take 81 mg by mouth once daily., Disp: , Rfl:     carvediloL (COREG) 25 MG tablet, TAKE ONE (1) TABLET BY MOUTH TWICE DAILY FOR BLOOD PRESSURE, Disp: 180 tablet, Rfl: 1    ELIQUIS 5 mg Tab, Take 1 tablet (5 mg total) by mouth 2 (two) times daily., Disp: 180 tablet, Rfl: 1    FARXIGA 10 mg tablet, TAKE ONE (1) TABLET (10 MILLIGRAM TOTAL) BY MOUTH ONCE DAILY., Disp: 90 tablet, Rfl: 1    fluticasone-umeclidin-vilanter (TRELEGY ELLIPTA) 100-62.5-25 mcg DsDv, INHALE ONE (1) PUFF BY MOUTH DAILY FOR BREATHING, Disp: 1 each, Rfl: 5    glimepiride (AMARYL) 4 MG tablet, Take 0.5 tablets (2 mg total) by mouth 2 (two) times daily., Disp: 90 tablet, Rfl: 1    JANUVIA 100 mg Tab, TAKE ONE (1) TABLET (100 MILLIGRAM TOTAL) BY MOUTH ONCE DAILY., Disp: 90 tablet, Rfl: 1    metFORMIN (GLUCOPHAGE) 1000 MG tablet, TAKE ONE (1) TABLET (1,000 MILLIGRAM TOTAL) BY MOUTH 2 (TWO) TIMES DAILY WITH MEALS., Disp: 180 tablet, Rfl: 1    multivitamin (THERAGRAN) per tablet, Take 1 tablet by mouth once daily., Disp: , Rfl:     zinc gluconate 50 mg tablet, Take 50 mg by mouth once daily., Disp: , Rfl:     atorvastatin (LIPITOR) 10 MG tablet, Take 1 tablet (10 mg total) by mouth every evening., Disp: 90 tablet, Rfl: 1   Past Medical History:   Diagnosis Date    ARF (acute renal failure)     Atrial fibrillation     Carotid artery occlusion     normal dopplers 8/20    COPD (chronic obstructive pulmonary disease)     Coronary artery disease     Diabetes mellitus, type 2     GERD (gastroesophageal reflux disease)     H/O iron deficiency anemia     History of pericarditis     Hyperlipidemia     Hypertension      GARCÍA (nonalcoholic steatohepatitis)       Past Surgical History:   Procedure Laterality Date    APPENDECTOMY      TONSILLECTOMY        Family History   Problem Relation Age of Onset    Hypertension Mother     Diabetes Mother       Social History     Tobacco Use    Smoking status: Never    Smokeless tobacco: Never   Substance Use Topics    Alcohol use: Not Currently    Drug use: Never        Review of Systems   Constitutional:  Negative for fatigue and fever.   HENT:  Negative for nasal congestion and sore throat.    Respiratory:  Negative for cough, shortness of breath and wheezing.    Cardiovascular:  Negative for chest pain and palpitations.   Gastrointestinal:  Negative for abdominal pain and blood in stool.   Genitourinary:  Negative for dysuria.   Musculoskeletal:  Negative for back pain and neck pain.   Integumentary:  Negative for rash and mole/lesion.   Neurological:  Negative for dizziness, headaches and memory loss.   Psychiatric/Behavioral:  Negative for agitation. The patient is not nervous/anxious.       Objective:  /78 (BP Location: Left arm, Patient Position: Sitting)   Pulse 91   Temp 97 °F (36.1 °C) (Temporal)   Resp 16   Ht 6' (1.829 m)   Wt 84.4 kg (186 lb)   SpO2 98%   BMI 25.23 kg/m²      Physical Exam  Constitutional:       Appearance: Normal appearance.   HENT:      Head: Normocephalic and atraumatic.      Right Ear: External ear normal.      Left Ear: External ear normal.      Nose: Nose normal.   Eyes:      Extraocular Movements: Extraocular movements intact.      Conjunctiva/sclera: Conjunctivae normal.      Pupils: Pupils are equal, round, and reactive to light.   Cardiovascular:      Rate and Rhythm: Normal rate and regular rhythm.      Pulses: Normal pulses.      Heart sounds: Normal heart sounds. No murmur heard.    No friction rub. No gallop.   Pulmonary:      Effort: Pulmonary effort is normal.      Breath sounds: No wheezing, rhonchi or rales.   Abdominal:      General:  Abdomen is flat.      Palpations: Abdomen is soft.   Musculoskeletal:      Cervical back: Normal range of motion and neck supple.      Right lower leg: No edema.      Left lower leg: No edema.   Skin:     General: Skin is warm and dry.      Findings: No rash.   Neurological:      General: No focal deficit present.      Mental Status: He is alert and oriented to person, place, and time.      Cranial Nerves: No cranial nerve deficit.   Psychiatric:         Mood and Affect: Mood normal.        Assessment and Plan    Type 2 diabetes mellitus without complication, without long-term current use of insulin  -     Hemoglobin A1C; Future; Expected date: 11/11/2022    Hyperlipidemia, unspecified hyperlipidemia type  -     atorvastatin (LIPITOR) 10 MG tablet; Take 1 tablet (10 mg total) by mouth every evening.  Dispense: 90 tablet; Refill: 1    Primary hypertension       Problem List Items Addressed This Visit          Cardiac/Vascular    Hypertension (Chronic)    Hyperlipidemia (Chronic)    Relevant Medications    atorvastatin (LIPITOR) 10 MG tablet       Endocrine    Diabetes mellitus, type 2 - Primary (Chronic)    Relevant Orders    Hemoglobin A1C      1-DMII-needs a1c  2-HTN is controlled  3-refilled lipitor today  Did fine with covid, no residual issues  Flu shot today  Follow up in about 3 months (around 2/11/2023).

## 2023-02-08 ENCOUNTER — HOSPITAL ENCOUNTER (OUTPATIENT)
Dept: CARDIOLOGY | Facility: HOSPITAL | Age: 68
Discharge: HOME | End: 2023-02-08
Attending: INTERNAL MEDICINE
Payer: MEDICARE

## 2023-02-08 DIAGNOSIS — Z95.0 PRESENCE OF CARDIAC PACEMAKER: ICD-10-CM

## 2023-02-08 DIAGNOSIS — Z95.0 PRESENCE OF CARDIAC PACEMAKER: Primary | ICD-10-CM

## 2023-02-08 PROCEDURE — 93294 CARDIAC DEVICE CHECK - REMOTE: ICD-10-PCS | Mod: ,,, | Performed by: INTERNAL MEDICINE

## 2023-02-08 PROCEDURE — 93294 REM INTERROG EVL PM/LDLS PM: CPT | Mod: ,,, | Performed by: INTERNAL MEDICINE

## 2023-02-08 PROCEDURE — 93296 REM INTERROG EVL PM/IDS: CPT

## 2023-02-10 ENCOUNTER — OFFICE VISIT (OUTPATIENT)
Dept: FAMILY MEDICINE | Facility: CLINIC | Age: 68
End: 2023-02-10
Payer: MEDICARE

## 2023-02-10 VITALS
SYSTOLIC BLOOD PRESSURE: 138 MMHG | HEIGHT: 72 IN | HEART RATE: 99 BPM | WEIGHT: 185 LBS | RESPIRATION RATE: 16 BRPM | TEMPERATURE: 97 F | OXYGEN SATURATION: 99 % | BODY MASS INDEX: 25.06 KG/M2 | DIASTOLIC BLOOD PRESSURE: 74 MMHG

## 2023-02-10 DIAGNOSIS — I10 PRIMARY HYPERTENSION: Chronic | ICD-10-CM

## 2023-02-10 DIAGNOSIS — I48.0 PAROXYSMAL ATRIAL FIBRILLATION: Chronic | ICD-10-CM

## 2023-02-10 DIAGNOSIS — E78.5 HYPERLIPIDEMIA, UNSPECIFIED HYPERLIPIDEMIA TYPE: ICD-10-CM

## 2023-02-10 DIAGNOSIS — K75.81 NASH (NONALCOHOLIC STEATOHEPATITIS): Chronic | ICD-10-CM

## 2023-02-10 DIAGNOSIS — J43.1 PANLOBULAR EMPHYSEMA: ICD-10-CM

## 2023-02-10 DIAGNOSIS — Z86.2 H/O IRON DEFICIENCY ANEMIA: ICD-10-CM

## 2023-02-10 DIAGNOSIS — I25.10 CORONARY ARTERY DISEASE INVOLVING NATIVE CORONARY ARTERY OF NATIVE HEART WITHOUT ANGINA PECTORIS: ICD-10-CM

## 2023-02-10 DIAGNOSIS — K21.9 GASTROESOPHAGEAL REFLUX DISEASE WITHOUT ESOPHAGITIS: Chronic | ICD-10-CM

## 2023-02-10 DIAGNOSIS — Z12.5 SCREENING PSA (PROSTATE SPECIFIC ANTIGEN): ICD-10-CM

## 2023-02-10 DIAGNOSIS — E11.9 TYPE 2 DIABETES MELLITUS WITHOUT COMPLICATION, WITHOUT LONG-TERM CURRENT USE OF INSULIN: Primary | ICD-10-CM

## 2023-02-10 DIAGNOSIS — R07.89 OTHER CHEST PAIN: ICD-10-CM

## 2023-02-10 LAB
ALBUMIN SERPL BCP-MCNC: 3.9 G/DL (ref 3.5–5)
ALP SERPL-CCNC: 58 U/L (ref 45–115)
ALT SERPL W P-5'-P-CCNC: 46 U/L (ref 16–61)
ANION GAP SERPL CALCULATED.3IONS-SCNC: 9 MMOL/L (ref 7–16)
AST SERPL W P-5'-P-CCNC: 28 U/L (ref 15–37)
BASOPHILS # BLD AUTO: 0.07 K/UL (ref 0–0.2)
BASOPHILS NFR BLD AUTO: 1 % (ref 0–1)
BILIRUB DIRECT SERPL-MCNC: <0.1 MG/DL (ref 0–0.2)
BILIRUB SERPL-MCNC: 0.3 MG/DL (ref ?–1.2)
BUN SERPL-MCNC: 29 MG/DL (ref 7–18)
BUN/CREAT SERPL: 29 (ref 6–20)
CALCIUM SERPL-MCNC: 9.5 MG/DL (ref 8.5–10.1)
CHLORIDE SERPL-SCNC: 99 MMOL/L (ref 98–107)
CHOLEST SERPL-MCNC: 107 MG/DL (ref 0–200)
CHOLEST/HDLC SERPL: 3.8 {RATIO}
CO2 SERPL-SCNC: 30 MMOL/L (ref 21–32)
CREAT SERPL-MCNC: 1 MG/DL (ref 0.7–1.3)
CREAT UR-MCNC: 34 MG/DL (ref 39–259)
DIFFERENTIAL METHOD BLD: ABNORMAL
EGFR (NO RACE VARIABLE) (RUSH/TITUS): 81 ML/MIN/1.73M²
EKG 12-LEAD: NORMAL
EOSINOPHIL # BLD AUTO: 0.12 K/UL (ref 0–0.5)
EOSINOPHIL NFR BLD AUTO: 1.7 % (ref 1–4)
ERYTHROCYTE [DISTWIDTH] IN BLOOD BY AUTOMATED COUNT: 15.9 % (ref 11.5–14.5)
EST. AVERAGE GLUCOSE BLD GHB EST-MCNC: 140 MG/DL
GLUCOSE SERPL-MCNC: 154 MG/DL (ref 74–106)
HBA1C MFR BLD HPLC: 6.8 % (ref 4.5–6.6)
HCT VFR BLD AUTO: 40.2 % (ref 40–54)
HDLC SERPL-MCNC: 28 MG/DL (ref 40–60)
HGB BLD-MCNC: 12.7 G/DL (ref 13.5–18)
IMM GRANULOCYTES # BLD AUTO: 0.07 K/UL (ref 0–0.04)
IMM GRANULOCYTES NFR BLD: 1 % (ref 0–0.4)
LDLC SERPL CALC-MCNC: 41 MG/DL
LDLC/HDLC SERPL: 1.5 {RATIO}
LYMPHOCYTES # BLD AUTO: 1.31 K/UL (ref 1–4.8)
LYMPHOCYTES NFR BLD AUTO: 18.3 % (ref 27–41)
MCH RBC QN AUTO: 28.2 PG (ref 27–31)
MCHC RBC AUTO-ENTMCNC: 31.6 G/DL (ref 32–36)
MCV RBC AUTO: 89.3 FL (ref 80–96)
MICROALBUMIN UR-MCNC: 23 MG/DL (ref 0–2.8)
MICROALBUMIN/CREAT RATIO PNL UR: 676.5 MG/G (ref 0–30)
MONOCYTES # BLD AUTO: 0.71 K/UL (ref 0–0.8)
MONOCYTES NFR BLD AUTO: 9.9 % (ref 2–6)
MPC BLD CALC-MCNC: 11.5 FL (ref 9.4–12.4)
NEUTROPHILS # BLD AUTO: 4.87 K/UL (ref 1.8–7.7)
NEUTROPHILS NFR BLD AUTO: 68.1 % (ref 53–65)
NONHDLC SERPL-MCNC: 79 MG/DL
NRBC # BLD AUTO: 0 X10E3/UL
NRBC, AUTO (.00): 0 %
PLATELET # BLD AUTO: 202 K/UL (ref 150–400)
POTASSIUM SERPL-SCNC: 4.8 MMOL/L (ref 3.5–5.1)
PR INTERVAL: NORMAL
PROT SERPL-MCNC: 7.8 G/DL (ref 6.4–8.2)
PRT AXES: NORMAL
QRS DURATION: NORMAL
QT/QTC: NORMAL
RBC # BLD AUTO: 4.5 M/UL (ref 4.6–6.2)
SODIUM SERPL-SCNC: 133 MMOL/L (ref 136–145)
TRIGL SERPL-MCNC: 188 MG/DL (ref 35–150)
VENTRICULAR RATE: NORMAL
VLDLC SERPL-MCNC: 38 MG/DL
WBC # BLD AUTO: 7.15 K/UL (ref 4.5–11)

## 2023-02-10 PROCEDURE — 85025 CBC WITH DIFFERENTIAL: ICD-10-PCS | Mod: ,,, | Performed by: CLINICAL MEDICAL LABORATORY

## 2023-02-10 PROCEDURE — 93010 ELECTROCARDIOGRAM REPORT: CPT | Mod: ,,, | Performed by: INTERNAL MEDICINE

## 2023-02-10 PROCEDURE — 82570 ASSAY OF URINE CREATININE: CPT | Mod: ,,, | Performed by: CLINICAL MEDICAL LABORATORY

## 2023-02-10 PROCEDURE — 83036 HEMOGLOBIN A1C: ICD-10-PCS | Mod: ,,, | Performed by: CLINICAL MEDICAL LABORATORY

## 2023-02-10 PROCEDURE — 85025 COMPLETE CBC W/AUTO DIFF WBC: CPT | Mod: ,,, | Performed by: CLINICAL MEDICAL LABORATORY

## 2023-02-10 PROCEDURE — 80048 BASIC METABOLIC PANEL: ICD-10-PCS | Mod: ,,, | Performed by: CLINICAL MEDICAL LABORATORY

## 2023-02-10 PROCEDURE — 80048 BASIC METABOLIC PNL TOTAL CA: CPT | Mod: ,,, | Performed by: CLINICAL MEDICAL LABORATORY

## 2023-02-10 PROCEDURE — 80061 LIPID PANEL: ICD-10-PCS | Mod: ,,, | Performed by: CLINICAL MEDICAL LABORATORY

## 2023-02-10 PROCEDURE — 93005 ELECTROCARDIOGRAM TRACING: CPT | Mod: RHCUB | Performed by: INTERNAL MEDICINE

## 2023-02-10 PROCEDURE — 83036 HEMOGLOBIN GLYCOSYLATED A1C: CPT | Mod: ,,, | Performed by: CLINICAL MEDICAL LABORATORY

## 2023-02-10 PROCEDURE — 99214 OFFICE O/P EST MOD 30 MIN: CPT | Mod: ,,, | Performed by: INTERNAL MEDICINE

## 2023-02-10 PROCEDURE — 80076 HEPATIC FUNCTION PANEL: CPT | Mod: ,,, | Performed by: CLINICAL MEDICAL LABORATORY

## 2023-02-10 PROCEDURE — 93010 PR ELECTROCARDIOGRAM REPORT: ICD-10-PCS | Mod: ,,, | Performed by: INTERNAL MEDICINE

## 2023-02-10 PROCEDURE — 80061 LIPID PANEL: CPT | Mod: ,,, | Performed by: CLINICAL MEDICAL LABORATORY

## 2023-02-10 PROCEDURE — 82570 MICROALBUMIN / CREATININE RATIO URINE: ICD-10-PCS | Mod: ,,, | Performed by: CLINICAL MEDICAL LABORATORY

## 2023-02-10 PROCEDURE — 80076 HEPATIC FUNCTION PANEL: ICD-10-PCS | Mod: ,,, | Performed by: CLINICAL MEDICAL LABORATORY

## 2023-02-10 PROCEDURE — 99214 PR OFFICE/OUTPT VISIT, EST, LEVL IV, 30-39 MIN: ICD-10-PCS | Mod: ,,, | Performed by: INTERNAL MEDICINE

## 2023-02-10 PROCEDURE — 82043 UR ALBUMIN QUANTITATIVE: CPT | Mod: ,,, | Performed by: CLINICAL MEDICAL LABORATORY

## 2023-02-10 PROCEDURE — 82043 MICROALBUMIN / CREATININE RATIO URINE: ICD-10-PCS | Mod: ,,, | Performed by: CLINICAL MEDICAL LABORATORY

## 2023-02-10 RX ORDER — GLIMEPIRIDE 4 MG/1
TABLET ORAL
Qty: 90 TABLET | Refills: 1 | Status: SHIPPED | OUTPATIENT
Start: 2023-02-10 | End: 2023-08-11 | Stop reason: SDUPTHER

## 2023-02-10 RX ORDER — APIXABAN 5 MG/1
5 TABLET, FILM COATED ORAL 2 TIMES DAILY
Qty: 180 TABLET | Refills: 1 | Status: SHIPPED | OUTPATIENT
Start: 2023-02-10 | End: 2023-08-11 | Stop reason: SDUPTHER

## 2023-02-10 RX ORDER — ATORVASTATIN CALCIUM 10 MG/1
10 TABLET, FILM COATED ORAL NIGHTLY
Qty: 90 TABLET | Refills: 1 | Status: SHIPPED | OUTPATIENT
Start: 2023-02-10 | End: 2023-08-11 | Stop reason: SDUPTHER

## 2023-02-10 RX ORDER — SITAGLIPTIN 100 MG/1
100 TABLET, FILM COATED ORAL DAILY
Qty: 90 TABLET | Refills: 1 | Status: SHIPPED | OUTPATIENT
Start: 2023-02-10 | End: 2023-08-11 | Stop reason: SDUPTHER

## 2023-02-10 RX ORDER — METFORMIN HYDROCHLORIDE 1000 MG/1
TABLET ORAL
Qty: 180 TABLET | Refills: 1 | Status: SHIPPED | OUTPATIENT
Start: 2023-02-10 | End: 2023-12-14 | Stop reason: SDUPTHER

## 2023-02-10 NOTE — PROGRESS NOTES
New Clinic Note    Patient Name:  Pro Aguirre is a 67 y.o. male     Chief Complaint:    Chief Complaint   Patient presents with    Follow-up     Patient is here for his checkup today.         Subjective  HPI         Current Outpatient Medications:     aspirin (ECOTRIN) 81 MG EC tablet, Take 81 mg by mouth once daily., Disp: , Rfl:     carvediloL (COREG) 25 MG tablet, TAKE ONE (1) TABLET BY MOUTH TWICE DAILY FOR BLOOD PRESSURE, Disp: 180 tablet, Rfl: 1    FARXIGA 10 mg tablet, TAKE ONE (1) TABLET (10 MILLIGRAM TOTAL) BY MOUTH ONCE DAILY., Disp: 90 tablet, Rfl: 1    fluticasone-umeclidin-vilanter (TRELEGY ELLIPTA) 100-62.5-25 mcg DsDv, INHALE ONE (1) PUFF BY MOUTH DAILY FOR BREATHING, Disp: 1 each, Rfl: 5    multivitamin (THERAGRAN) per tablet, Take 1 tablet by mouth once daily., Disp: , Rfl:     zinc gluconate 50 mg tablet, Take 50 mg by mouth once daily., Disp: , Rfl:     atorvastatin (LIPITOR) 10 MG tablet, Take 1 tablet (10 mg total) by mouth every evening., Disp: 90 tablet, Rfl: 1    ELIQUIS 5 mg Tab, Take 1 tablet (5 mg total) by mouth 2 (two) times daily., Disp: 180 tablet, Rfl: 1    glimepiride (AMARYL) 4 MG tablet, TAKE 0.5 TABLETS (2 MILLIGRAM TOTAL) BY MOUTH 2 (TWO) TIMES DAILY., Disp: 90 tablet, Rfl: 1    JANUVIA 100 mg Tab, Take 1 tablet (100 mg total) by mouth once daily., Disp: 90 tablet, Rfl: 1    metFORMIN (GLUCOPHAGE) 1000 MG tablet, TAKE ONE (1) TABLET (1,000 MILLIGRAM TOTAL) BY MOUTH 2 (TWO) TIMES DAILY WITH MEALS., Disp: 180 tablet, Rfl: 1   Past Medical History:   Diagnosis Date    ARF (acute renal failure)     Atrial fibrillation     Carotid artery occlusion     normal dopplers 8/20    COPD (chronic obstructive pulmonary disease)     Coronary artery disease     Diabetes mellitus, type 2     GERD (gastroesophageal reflux disease)     H/O iron deficiency anemia     History of pericarditis     Hyperlipidemia     Hypertension     GARCÍA (nonalcoholic steatohepatitis)       Past Surgical  History:   Procedure Laterality Date    APPENDECTOMY      TONSILLECTOMY        Family History   Problem Relation Age of Onset    Hypertension Mother     Diabetes Mother       Social History     Tobacco Use    Smoking status: Never    Smokeless tobacco: Never   Substance Use Topics    Alcohol use: Not Currently    Drug use: Never        Review of Systems   Constitutional:  Negative for fatigue and fever.   HENT:  Negative for nasal congestion and sore throat.    Respiratory:  Negative for cough, shortness of breath and wheezing.    Cardiovascular:  Positive for chest pain. Negative for palpitations.   Gastrointestinal:  Negative for abdominal pain and blood in stool.   Genitourinary:  Negative for dysuria.   Musculoskeletal:  Negative for back pain and neck pain.   Integumentary:  Negative for rash and mole/lesion.   Neurological:  Negative for dizziness, headaches and memory loss.   Psychiatric/Behavioral:  Negative for agitation. The patient is not nervous/anxious.       Objective:  /74 (BP Location: Left arm, Patient Position: Sitting)   Pulse 99   Temp 97 °F (36.1 °C) (Temporal)   Resp 16   Ht 6' (1.829 m)   Wt 83.9 kg (185 lb)   SpO2 99%   BMI 25.09 kg/m²      Physical Exam  Constitutional:       Appearance: Normal appearance.   HENT:      Head: Normocephalic and atraumatic.      Right Ear: External ear normal.      Left Ear: External ear normal.      Nose: Nose normal.   Eyes:      Extraocular Movements: Extraocular movements intact.      Conjunctiva/sclera: Conjunctivae normal.      Pupils: Pupils are equal, round, and reactive to light.   Cardiovascular:      Rate and Rhythm: Normal rate and regular rhythm.      Pulses: Normal pulses.      Heart sounds: Normal heart sounds. No murmur heard.    No friction rub. No gallop.   Pulmonary:      Effort: Pulmonary effort is normal.      Breath sounds: No wheezing, rhonchi or rales.   Abdominal:      General: Abdomen is flat.      Palpations: Abdomen is  soft.   Musculoskeletal:      Cervical back: Normal range of motion and neck supple.      Right lower leg: No edema.      Left lower leg: No edema.   Skin:     General: Skin is warm and dry.      Findings: No rash.   Neurological:      General: No focal deficit present.      Mental Status: He is alert and oriented to person, place, and time.      Cranial Nerves: No cranial nerve deficit.   Psychiatric:         Mood and Affect: Mood normal.        Assessment and Plan    Type 2 diabetes mellitus without complication, without long-term current use of insulin  -     metFORMIN (GLUCOPHAGE) 1000 MG tablet; TAKE ONE (1) TABLET (1,000 MILLIGRAM TOTAL) BY MOUTH 2 (TWO) TIMES DAILY WITH MEALS.  Dispense: 180 tablet; Refill: 1  -     JANUVIA 100 mg Tab; Take 1 tablet (100 mg total) by mouth once daily.  Dispense: 90 tablet; Refill: 1  -     glimepiride (AMARYL) 4 MG tablet; TAKE 0.5 TABLETS (2 MILLIGRAM TOTAL) BY MOUTH 2 (TWO) TIMES DAILY.  Dispense: 90 tablet; Refill: 1  -     Hemoglobin A1C; Future; Expected date: 02/10/2023  -     Microalbumin/Creatinine Ratio, Urine    Coronary artery disease involving native coronary artery of native heart without angina pectoris  -     ELIQUIS 5 mg Tab; Take 1 tablet (5 mg total) by mouth 2 (two) times daily.  Dispense: 180 tablet; Refill: 1    Hyperlipidemia, unspecified hyperlipidemia type  -     atorvastatin (LIPITOR) 10 MG tablet; Take 1 tablet (10 mg total) by mouth every evening.  Dispense: 90 tablet; Refill: 1  -     Hepatic Function Panel; Future; Expected date: 02/10/2023  -     Lipid Panel; Future; Expected date: 02/10/2023    Other chest pain  -     POCT EKG 12-LEAD (NOT FOR [facility] USE)    Primary hypertension  -     Basic Metabolic Panel; Future; Expected date: 02/10/2023    Paroxysmal atrial fibrillation    H/O iron deficiency anemia  -     CBC Auto Differential; Future; Expected date: 02/10/2023    Gastroesophageal reflux disease without esophagitis    GARCÍA (nonalcoholic  steatohepatitis)    Screening PSA (prostate specific antigen)    Panlobular emphysema         Problem List Items Addressed This Visit          Pulmonary    COPD (chronic obstructive pulmonary disease) (Chronic)       Cardiac/Vascular    Hypertension (Chronic)    Relevant Orders    Basic Metabolic Panel    Hyperlipidemia (Chronic)    Relevant Medications    atorvastatin (LIPITOR) 10 MG tablet    Other Relevant Orders    Hepatic Function Panel    Lipid Panel    Coronary artery disease (Chronic)    Overview     Very mild CAD by [facility] 8/31/2015         Relevant Medications    ELIQUIS 5 mg Tab    Atrial fibrillation (Chronic)    Overview     Paroxysmal; cva prophylaxis with Eliquis            Oncology    H/O iron deficiency anemia (Chronic)    Relevant Orders    CBC Auto Differential       Endocrine    Diabetes mellitus, type 2 - Primary (Chronic)    Relevant Medications    metFORMIN (GLUCOPHAGE) 1000 MG tablet    JANUVIA 100 mg Tab    glimepiride (AMARYL) 4 MG tablet    Other Relevant Orders    Hemoglobin A1C    Microalbumin/Creatinine Ratio, Urine       GI    GERD (gastroesophageal reflux disease) (Chronic)    GARCÍA (nonalcoholic steatohepatitis) (Chronic)     Other Visit Diagnoses       Other chest pain        Relevant Orders    POCT EKG 12-LEAD (NOT FOR [facility] USE)    Screening PSA (prostate specific antigen)             1-Pro had one episode of chest pain, it went away with rest.  His EKG today looks the same as previous tracing rate 75 bpm RBBB.  He has cardiology f/u on Monday (3 days).  If he has chest pain over the weekend go to ED.  Otherwise, he probably will need stress test vs other  2-DMII-a1c cont meds  3-CAD see #1  4-GERD stable on PPI  5-screening PSA today  6-a fib controlled, cont eliquis  7-hyperlipidemia-, labs, cont statin  8hx of iron def anemia-CBC  9-hx of COPD-stable not an issues currently  Follow up in about 6 months (around 8/10/2023).

## 2023-02-13 ENCOUNTER — OFFICE VISIT (OUTPATIENT)
Dept: CARDIOLOGY | Facility: CLINIC | Age: 68
End: 2023-02-13
Payer: MEDICARE

## 2023-02-13 VITALS
WEIGHT: 186 LBS | HEIGHT: 72 IN | HEART RATE: 84 BPM | BODY MASS INDEX: 25.19 KG/M2 | OXYGEN SATURATION: 99 % | SYSTOLIC BLOOD PRESSURE: 124 MMHG | DIASTOLIC BLOOD PRESSURE: 80 MMHG

## 2023-02-13 DIAGNOSIS — I48.91 ATRIAL FIBRILLATION, UNSPECIFIED TYPE: Primary | ICD-10-CM

## 2023-02-13 DIAGNOSIS — R07.89 ATYPICAL CHEST PAIN: ICD-10-CM

## 2023-02-13 DIAGNOSIS — I25.118 CORONARY ARTERY DISEASE OF NATIVE ARTERY OF NATIVE HEART WITH STABLE ANGINA PECTORIS: ICD-10-CM

## 2023-02-13 PROCEDURE — 99214 OFFICE O/P EST MOD 30 MIN: CPT | Mod: PBBFAC | Performed by: NURSE PRACTITIONER

## 2023-02-13 PROCEDURE — 99214 PR OFFICE/OUTPT VISIT, EST, LEVL IV, 30-39 MIN: ICD-10-PCS | Mod: S$PBB,,, | Performed by: NURSE PRACTITIONER

## 2023-02-13 PROCEDURE — 93005 ELECTROCARDIOGRAM TRACING: CPT | Mod: PBBFAC | Performed by: INTERNAL MEDICINE

## 2023-02-13 PROCEDURE — 93010 ELECTROCARDIOGRAM REPORT: CPT | Mod: S$PBB,,, | Performed by: INTERNAL MEDICINE

## 2023-02-13 PROCEDURE — 93010 EKG 12-LEAD: ICD-10-PCS | Mod: S$PBB,,, | Performed by: INTERNAL MEDICINE

## 2023-02-13 PROCEDURE — 99214 OFFICE O/P EST MOD 30 MIN: CPT | Mod: S$PBB,,, | Performed by: NURSE PRACTITIONER

## 2023-02-13 RX ORDER — NITROGLYCERIN 0.4 MG/1
0.4 TABLET SUBLINGUAL EVERY 5 MIN PRN
Qty: 25 TABLET | Refills: 3 | Status: SHIPPED | OUTPATIENT
Start: 2023-02-13 | End: 2024-02-15

## 2023-02-14 DIAGNOSIS — Z95.0 PRESENCE OF CARDIAC PACEMAKER: Primary | ICD-10-CM

## 2023-02-14 PROBLEM — R07.89 ATYPICAL CHEST PAIN: Status: ACTIVE | Noted: 2023-02-14

## 2023-02-14 NOTE — PROGRESS NOTES
[facility] note        DATE OF SERVICE: 02/14/2023       PCP: Shivam Payne MD      CHIEF COMPLAINT:   Chief Complaint   Patient presents with    Chest Pain     1 time in january while working in the yard.        HISTORY OF PRESENT ILLNESS:  Pro Aguirre is a 67 y.o. male with a PMH of   Past Medical History:   Diagnosis Date    ARF (acute renal failure)     Atrial fibrillation     Carotid artery occlusion     normal dopplers 8/20    COPD (chronic obstructive pulmonary disease)     Coronary artery disease     Diabetes mellitus, type 2     GERD (gastroesophageal reflux disease)     H/O iron deficiency anemia     History of pericarditis     Hyperlipidemia     Hypertension     GARCÍA (nonalcoholic steatohepatitis)      who presents for routine follow up.He states that he has done well. He does report one episode of a sharp chest pain once in January. He was struggling to  shrubs tangled in briars. This lasted approx 5 minutes and resolved without intervention. He has had no issues prior to or since this.   Chief Complaint   Patient presents with    Chest Pain     1 time in january while working in the yard.            PAST MEDICAL HISTORY:  Past Medical History:   Diagnosis Date    ARF (acute renal failure)     Atrial fibrillation     Carotid artery occlusion     normal dopplers 8/20    COPD (chronic obstructive pulmonary disease)     Coronary artery disease     Diabetes mellitus, type 2     GERD (gastroesophageal reflux disease)     H/O iron deficiency anemia     History of pericarditis     Hyperlipidemia     Hypertension     GARCÍA (nonalcoholic steatohepatitis)        PAST SURGICAL HISTORY:  Past Surgical History:   Procedure Laterality Date    APPENDECTOMY      TONSILLECTOMY         SOCIAL HISTORY:  Social History     Socioeconomic History    Marital status:    Tobacco Use    Smoking status: Never    Smokeless tobacco: Never   Substance and Sexual Activity    Alcohol use: Not  Currently    Drug use: Never       FAMILY HISTORY:  Family History   Problem Relation Age of Onset    Hypertension Mother     Diabetes Mother          ALLERGIES:  Review of patient's allergies indicates:  No Known Allergies     MEDICATIONS:    Current Outpatient Medications:     aspirin (ECOTRIN) 81 MG EC tablet, Take 81 mg by mouth once daily., Disp: , Rfl:     atorvastatin (LIPITOR) 10 MG tablet, Take 1 tablet (10 mg total) by mouth every evening., Disp: 90 tablet, Rfl: 1    carvediloL (COREG) 25 MG tablet, TAKE ONE (1) TABLET BY MOUTH TWICE DAILY FOR BLOOD PRESSURE, Disp: 180 tablet, Rfl: 1    ELIQUIS 5 mg Tab, Take 1 tablet (5 mg total) by mouth 2 (two) times daily., Disp: 180 tablet, Rfl: 1    FARXIGA 10 mg tablet, TAKE ONE (1) TABLET (10 MILLIGRAM TOTAL) BY MOUTH ONCE DAILY., Disp: 90 tablet, Rfl: 1    fluticasone-umeclidin-vilanter (TRELEGY ELLIPTA) 100-62.5-25 mcg DsDv, INHALE ONE (1) PUFF BY MOUTH DAILY FOR BREATHING, Disp: 1 each, Rfl: 5    glimepiride (AMARYL) 4 MG tablet, TAKE 0.5 TABLETS (2 MILLIGRAM TOTAL) BY MOUTH 2 (TWO) TIMES DAILY., Disp: 90 tablet, Rfl: 1    JANUVIA 100 mg Tab, Take 1 tablet (100 mg total) by mouth once daily., Disp: 90 tablet, Rfl: 1    metFORMIN (GLUCOPHAGE) 1000 MG tablet, TAKE ONE (1) TABLET (1,000 MILLIGRAM TOTAL) BY MOUTH 2 (TWO) TIMES DAILY WITH MEALS., Disp: 180 tablet, Rfl: 1    multivitamin (THERAGRAN) per tablet, Take 1 tablet by mouth once daily., Disp: , Rfl:     zinc gluconate 50 mg tablet, Take 50 mg by mouth once daily., Disp: , Rfl:     nitroGLYCERIN (NITROSTAT) 0.4 MG SL tablet, Place 1 tablet (0.4 mg total) under the tongue every 5 (five) minutes as needed for Chest pain., Disp: 25 tablet, Rfl: 3  Medications have been reviewed and reconciled.     PHYSICAL EXAM:  /80 (BP Location: Left arm, Patient Position: Sitting)   Pulse 84   Ht 6' (1.829 m)   Wt 84.4 kg (186 lb)   SpO2 99%   BMI 25.23 kg/m²   Wt Readings from Last 3 Encounters:   02/13/23 84.4 kg  (186 lb)   02/10/23 83.9 kg (185 lb)   22 84.4 kg (186 lb)      Body mass index is 25.23 kg/m².    Physical Exam  Vitals and nursing note reviewed.   Constitutional:       Appearance: Normal appearance. He is normal weight.   HENT:      Head: Normocephalic and atraumatic.   Eyes:      Pupils: Pupils are equal, round, and reactive to light.   Neck:      Vascular: No carotid bruit.   Cardiovascular:      Rate and Rhythm: Normal rate and regular rhythm.      Pulses: Normal pulses.      Heart sounds: Normal heart sounds.      Comments: Well healed ppm site to left upper chest wall  Abdominal:      General: Bowel sounds are normal.   Musculoskeletal:      Cervical back: Neck supple.      Right lower leg: No edema.      Left lower leg: No edema.   Skin:     General: Skin is dry.      Capillary Refill: Capillary refill takes less than 2 seconds.   Neurological:      General: No focal deficit present.      Mental Status: He is alert and oriented to person, place, and time.   Psychiatric:         Mood and Affect: Mood normal.         Behavior: Behavior normal.       LABS REVIEWED:  Lab Results   Component Value Date    WBC 7.15 02/10/2023    RBC 4.50 (L) 02/10/2023    HGB 12.7 (L) 02/10/2023    HCT 40.2 02/10/2023    MCV 89.3 02/10/2023    MCH 28.2 02/10/2023    MCHC 31.6 (L) 02/10/2023    RDW 15.9 (H) 02/10/2023     02/10/2023    MPV 11.5 02/10/2023    NRBC 0.0 02/10/2023     Lab Results   Component Value Date     (L) 02/10/2023    K 4.8 02/10/2023    CL 99 02/10/2023    CO2 30 02/10/2023    BUN 29 (H) 02/10/2023     Lab Results   Component Value Date    AST 28 02/10/2023    ALT 46 02/10/2023     Lab Results   Component Value Date     (H) 02/10/2023    HGBA1C 6.8 (H) 02/10/2023     Lab Results   Component Value Date    CHOL 107 02/10/2023    HDL 28 (L) 02/10/2023    TRIG 188 (H) 02/10/2023    CHOLHDL 3.8 02/10/2023       CARDIAC STUDIES REVIEWED:EK2023 RSR with PAC/s; RBBB; old inferior q  waves; HR 89 bpm  8/9/2022 a paced v sensed; rhythm; RBBB; HR 85 bpm; old inferior q waves; no significant change from EKG done 2/2/2021        ASSESSMENT:   Patient Active Problem List   Diagnosis    Complete AV block    Hypertension    Hyperlipidemia    Coronary artery disease    Atrial fibrillation    GERD (gastroesophageal reflux disease)    COPD (chronic obstructive pulmonary disease)    H/O iron deficiency anemia    GARCÍA (nonalcoholic steatohepatitis)    Diabetes mellitus, type 2    History of pericarditis    Atypical chest pain      Atypical chest pain  Prn ntg  Patient and his wife were instructed on prior use of sublingual ntg.     Problem List Items Addressed This Visit          Cardiac/Vascular    Atrial fibrillation - Primary (Chronic)    Overview     Paroxysmal; cva prophylaxis with Eliquis         Relevant Orders    EKG 12-lead    Coronary artery disease (Chronic)    Overview     Very mild CAD by [facility] 8/31/2015         Relevant Medications    nitroGLYCERIN (NITROSTAT) 0.4 MG SL tablet       Other    Atypical chest pain    Current Assessment & Plan     Prn ntg  Patient and his wife were instructed on prior use of sublingual ntg.             PLAN:  Orders Placed This Encounter   Procedures    EKG 12-lead     Standing Status:   Future     Number of Occurrences:   1     Standing Expiration Date:   2/13/2024      RTC: 6 months

## 2023-05-09 DIAGNOSIS — Z71.89 COMPLEX CARE COORDINATION: ICD-10-CM

## 2023-05-18 ENCOUNTER — HOSPITAL ENCOUNTER (OUTPATIENT)
Dept: CARDIOLOGY | Facility: HOSPITAL | Age: 68
Discharge: HOME | End: 2023-05-18
Attending: INTERNAL MEDICINE
Payer: MEDICARE

## 2023-05-18 DIAGNOSIS — Z95.0 CARDIAC PACEMAKER IN SITU: ICD-10-CM

## 2023-05-18 DIAGNOSIS — Z95.0 CARDIAC PACEMAKER IN SITU: Primary | ICD-10-CM

## 2023-05-18 PROCEDURE — 93296 REM INTERROG EVL PM/IDS: CPT

## 2023-05-18 PROCEDURE — 93294 CARDIAC DEVICE CHECK - REMOTE: ICD-10-PCS | Mod: ,,, | Performed by: INTERNAL MEDICINE

## 2023-05-18 PROCEDURE — 93294 REM INTERROG EVL PM/LDLS PM: CPT | Mod: ,,, | Performed by: INTERNAL MEDICINE

## 2023-06-26 DIAGNOSIS — E11.9 TYPE 2 DIABETES MELLITUS WITHOUT COMPLICATION, WITHOUT LONG-TERM CURRENT USE OF INSULIN: ICD-10-CM

## 2023-06-26 RX ORDER — DAPAGLIFLOZIN 10 MG/1
TABLET, FILM COATED ORAL
Qty: 90 TABLET | Refills: 1 | Status: SHIPPED | OUTPATIENT
Start: 2023-06-26 | End: 2023-07-17 | Stop reason: SDUPTHER

## 2023-08-11 ENCOUNTER — OFFICE VISIT (OUTPATIENT)
Dept: FAMILY MEDICINE | Facility: CLINIC | Age: 68
End: 2023-08-11
Payer: MEDICARE

## 2023-08-11 VITALS
SYSTOLIC BLOOD PRESSURE: 120 MMHG | BODY MASS INDEX: 24.38 KG/M2 | WEIGHT: 180 LBS | RESPIRATION RATE: 18 BRPM | DIASTOLIC BLOOD PRESSURE: 78 MMHG | OXYGEN SATURATION: 98 % | TEMPERATURE: 99 F | HEART RATE: 101 BPM | HEIGHT: 72 IN

## 2023-08-11 DIAGNOSIS — I10 PRIMARY HYPERTENSION: Chronic | ICD-10-CM

## 2023-08-11 DIAGNOSIS — E78.5 HYPERLIPIDEMIA, UNSPECIFIED HYPERLIPIDEMIA TYPE: ICD-10-CM

## 2023-08-11 DIAGNOSIS — I25.10 CORONARY ARTERY DISEASE INVOLVING NATIVE CORONARY ARTERY OF NATIVE HEART WITHOUT ANGINA PECTORIS: ICD-10-CM

## 2023-08-11 DIAGNOSIS — E11.9 TYPE 2 DIABETES MELLITUS WITHOUT COMPLICATION, WITHOUT LONG-TERM CURRENT USE OF INSULIN: Primary | ICD-10-CM

## 2023-08-11 LAB
EST. AVERAGE GLUCOSE BLD GHB EST-MCNC: 157 MG/DL
HBA1C MFR BLD HPLC: 7.3 % (ref 4.5–6.6)

## 2023-08-11 PROCEDURE — 83036 HEMOGLOBIN A1C: ICD-10-PCS | Mod: ,,, | Performed by: CLINICAL MEDICAL LABORATORY

## 2023-08-11 PROCEDURE — 99214 OFFICE O/P EST MOD 30 MIN: CPT | Mod: ,,, | Performed by: INTERNAL MEDICINE

## 2023-08-11 PROCEDURE — 83036 HEMOGLOBIN GLYCOSYLATED A1C: CPT | Mod: ,,, | Performed by: CLINICAL MEDICAL LABORATORY

## 2023-08-11 PROCEDURE — 99214 PR OFFICE/OUTPT VISIT, EST, LEVL IV, 30-39 MIN: ICD-10-PCS | Mod: ,,, | Performed by: INTERNAL MEDICINE

## 2023-08-11 RX ORDER — APIXABAN 5 MG/1
5 TABLET, FILM COATED ORAL 2 TIMES DAILY
Qty: 180 TABLET | Refills: 1 | Status: SHIPPED | OUTPATIENT
Start: 2023-08-11 | End: 2024-02-09 | Stop reason: SDUPTHER

## 2023-08-11 RX ORDER — SITAGLIPTIN 100 MG/1
100 TABLET, FILM COATED ORAL DAILY
Qty: 90 TABLET | Refills: 1 | Status: SHIPPED | OUTPATIENT
Start: 2023-08-11 | End: 2024-02-09 | Stop reason: SDUPTHER

## 2023-08-11 RX ORDER — DAPAGLIFLOZIN 10 MG/1
10 TABLET, FILM COATED ORAL DAILY
Qty: 90 TABLET | Refills: 1 | Status: SHIPPED | OUTPATIENT
Start: 2023-08-11 | End: 2024-01-16 | Stop reason: SDUPTHER

## 2023-08-11 RX ORDER — ATORVASTATIN CALCIUM 10 MG/1
10 TABLET, FILM COATED ORAL NIGHTLY
Qty: 90 TABLET | Refills: 1 | Status: SHIPPED | OUTPATIENT
Start: 2023-08-11 | End: 2024-02-09 | Stop reason: SDUPTHER

## 2023-08-11 RX ORDER — GLIMEPIRIDE 4 MG/1
TABLET ORAL
Qty: 90 TABLET | Refills: 1 | Status: SHIPPED | OUTPATIENT
Start: 2023-08-11 | End: 2024-02-09 | Stop reason: SDUPTHER

## 2023-08-11 NOTE — PROGRESS NOTES
"New Clinic Note    Patient Name:  Pro Aguirre is a 68 y.o. male     Chief Complaint:    Chief Complaint   Patient presents with    Follow-up     Patient in for check up.    Diarrhea     He did have the "stomach bug" earlier in the week.  He said symptoms have improved, but he is still fatigued.        Subjective  Follow-up  Pertinent negatives include no abdominal pain, chest pain, congestion, coughing, fatigue, fever, headaches, neck pain, rash or sore throat.   Diarrhea   Pertinent negatives include no abdominal pain, coughing, fever or headaches.            Current Outpatient Medications:     aspirin (ECOTRIN) 81 MG EC tablet, Take 81 mg by mouth once daily., Disp: , Rfl:     carvediloL (COREG) 25 MG tablet, Take 1 tablet (25 mg total) by mouth 2 (two) times daily., Disp: 180 tablet, Rfl: 1    metFORMIN (GLUCOPHAGE) 1000 MG tablet, TAKE ONE (1) TABLET (1,000 MILLIGRAM TOTAL) BY MOUTH 2 (TWO) TIMES DAILY WITH MEALS., Disp: 180 tablet, Rfl: 1    multivitamin (THERAGRAN) per tablet, Take 1 tablet by mouth once daily., Disp: , Rfl:     nitroGLYCERIN (NITROSTAT) 0.4 MG SL tablet, Place 1 tablet (0.4 mg total) under the tongue every 5 (five) minutes as needed for Chest pain., Disp: 25 tablet, Rfl: 3    TRELEGY ELLIPTA 100-62.5-25 mcg DsDv, INHALE ONE (1) PUFF BY MOUTH DAILY FOR BREATHING, Disp: 28 each, Rfl: 5    zinc gluconate 50 mg tablet, Take 50 mg by mouth once daily., Disp: , Rfl:     atorvastatin (LIPITOR) 10 MG tablet, Take 1 tablet (10 mg total) by mouth every evening., Disp: 90 tablet, Rfl: 1    ELIQUIS 5 mg Tab, Take 1 tablet (5 mg total) by mouth 2 (two) times daily., Disp: 180 tablet, Rfl: 1    FARXIGA 10 mg tablet, Take 1 tablet (10 mg total) by mouth Daily., Disp: 90 tablet, Rfl: 1    glimepiride (AMARYL) 4 MG tablet, TAKE 0.5 TABLETS (2 MILLIGRAM TOTAL) BY MOUTH 2 (TWO) TIMES DAILY., Disp: 90 tablet, Rfl: 1    JANUVIA 100 mg Tab, Take 1 tablet (100 mg total) by mouth once daily., Disp: 90 " tablet, Rfl: 1   Past Medical History:   Diagnosis Date    ARF (acute renal failure)     Atrial fibrillation     Carotid artery occlusion     normal dopplers 8/20    COPD (chronic obstructive pulmonary disease)     Coronary artery disease     Diabetes mellitus, type 2     GERD (gastroesophageal reflux disease)     H/O iron deficiency anemia     History of pericarditis     Hyperlipidemia     Hypertension     GARCÍA (nonalcoholic steatohepatitis)       Past Surgical History:   Procedure Laterality Date    APPENDECTOMY      TONSILLECTOMY        Family History   Problem Relation Age of Onset    Hypertension Mother     Diabetes Mother       Social History     Tobacco Use    Smoking status: Never    Smokeless tobacco: Current     Types: Snuff   Substance Use Topics    Alcohol use: Not Currently    Drug use: Never        Review of Systems   Constitutional:  Negative for fatigue and fever.   HENT:  Negative for nasal congestion and sore throat.    Respiratory:  Negative for cough, shortness of breath and wheezing.    Cardiovascular:  Negative for chest pain and palpitations.   Gastrointestinal:  Positive for diarrhea. Negative for abdominal pain and blood in stool.   Genitourinary:  Negative for dysuria.   Musculoskeletal:  Negative for back pain and neck pain.   Integumentary:  Negative for rash and mole/lesion.   Neurological:  Negative for dizziness, headaches and memory loss.   Psychiatric/Behavioral:  Negative for agitation. The patient is not nervous/anxious.         Objective:  /78 (BP Location: Left arm, Patient Position: Sitting)   Pulse 101   Temp 98.7 °F (37.1 °C) (Oral)   Resp 18   Ht 6' (1.829 m)   Wt 81.6 kg (180 lb)   SpO2 98%   BMI 24.41 kg/m²      Physical Exam  Constitutional:       Appearance: Normal appearance.   HENT:      Head: Normocephalic and atraumatic.      Right Ear: External ear normal.      Left Ear: External ear normal.      Nose: Nose normal.   Eyes:      Extraocular Movements:  Extraocular movements intact.      Conjunctiva/sclera: Conjunctivae normal.      Pupils: Pupils are equal, round, and reactive to light.   Cardiovascular:      Rate and Rhythm: Normal rate and regular rhythm.      Pulses: Normal pulses.      Heart sounds: Normal heart sounds. No murmur heard.     No friction rub. No gallop.   Pulmonary:      Effort: Pulmonary effort is normal.      Breath sounds: No wheezing, rhonchi or rales.   Abdominal:      General: Abdomen is flat.      Palpations: Abdomen is soft.   Musculoskeletal:      Cervical back: Normal range of motion and neck supple.      Right lower leg: No edema.      Left lower leg: No edema.   Skin:     General: Skin is warm and dry.      Findings: No rash.   Neurological:      General: No focal deficit present.      Mental Status: He is alert and oriented to person, place, and time.      Cranial Nerves: No cranial nerve deficit.   Psychiatric:         Mood and Affect: Mood normal.          Assessment and Plan    Type 2 diabetes mellitus without complication, without long-term current use of insulin  -     JANUVIA 100 mg Tab; Take 1 tablet (100 mg total) by mouth once daily.  Dispense: 90 tablet; Refill: 1  -     glimepiride (AMARYL) 4 MG tablet; TAKE 0.5 TABLETS (2 MILLIGRAM TOTAL) BY MOUTH 2 (TWO) TIMES DAILY.  Dispense: 90 tablet; Refill: 1  -     FARXIGA 10 mg tablet; Take 1 tablet (10 mg total) by mouth Daily.  Dispense: 90 tablet; Refill: 1  -     Hemoglobin A1C; Future; Expected date: 08/11/2023    Hyperlipidemia, unspecified hyperlipidemia type  -     atorvastatin (LIPITOR) 10 MG tablet; Take 1 tablet (10 mg total) by mouth every evening.  Dispense: 90 tablet; Refill: 1    Coronary artery disease involving native coronary artery of native heart without angina pectoris  -     ELIQUIS 5 mg Tab; Take 1 tablet (5 mg total) by mouth 2 (two) times daily.  Dispense: 180 tablet; Refill: 1    Primary hypertension         Problem List Items Addressed This Visit           Cardiac/Vascular    Hypertension (Chronic)    Hyperlipidemia (Chronic)    Relevant Medications    atorvastatin (LIPITOR) 10 MG tablet    Coronary artery disease (Chronic)    Overview     Very mild CAD by [facility] 8/31/2015         Relevant Medications    ELIQUIS 5 mg Tab       Endocrine    Diabetes mellitus, type 2 - Primary (Chronic)    Relevant Medications    JANUVIA 100 mg Tab    glimepiride (AMARYL) 4 MG tablet    FARXIGA 10 mg tablet    Other Relevant Orders    Hemoglobin A1C      1-DMII-a1c  2-HTN controlled  3-CAD-stable, he has not had to use his NTG  Other labs were good in February.  Follow up in about 6 months (around 2/11/2024).

## 2023-08-15 ENCOUNTER — OFFICE VISIT (OUTPATIENT)
Dept: CARDIOLOGY | Facility: CLINIC | Age: 68
End: 2023-08-15
Payer: MEDICARE

## 2023-08-15 ENCOUNTER — HOSPITAL ENCOUNTER (OUTPATIENT)
Dept: CARDIOLOGY | Facility: HOSPITAL | Age: 68
Discharge: HOME | End: 2023-08-15
Attending: INTERNAL MEDICINE
Payer: MEDICARE

## 2023-08-15 VITALS
SYSTOLIC BLOOD PRESSURE: 122 MMHG | OXYGEN SATURATION: 96 % | WEIGHT: 178 LBS | BODY MASS INDEX: 24.11 KG/M2 | HEIGHT: 72 IN | HEART RATE: 82 BPM | DIASTOLIC BLOOD PRESSURE: 60 MMHG

## 2023-08-15 DIAGNOSIS — I10 HYPERTENSION, UNSPECIFIED TYPE: Chronic | ICD-10-CM

## 2023-08-15 DIAGNOSIS — Z95.0 PRESENCE OF CARDIAC PACEMAKER: Primary | ICD-10-CM

## 2023-08-15 DIAGNOSIS — Z95.0 PRESENCE OF CARDIAC PACEMAKER: ICD-10-CM

## 2023-08-15 DIAGNOSIS — E78.5 HYPERLIPIDEMIA, UNSPECIFIED HYPERLIPIDEMIA TYPE: Chronic | ICD-10-CM

## 2023-08-15 DIAGNOSIS — I44.2 COMPLETE AV BLOCK: Chronic | ICD-10-CM

## 2023-08-15 DIAGNOSIS — I48.0 PAROXYSMAL ATRIAL FIBRILLATION: Primary | Chronic | ICD-10-CM

## 2023-08-15 DIAGNOSIS — I25.10 CORONARY ARTERY DISEASE INVOLVING NATIVE CORONARY ARTERY OF NATIVE HEART WITHOUT ANGINA PECTORIS: Chronic | ICD-10-CM

## 2023-08-15 DIAGNOSIS — Z95.0 CARDIAC PACEMAKER IN SITU: ICD-10-CM

## 2023-08-15 PROBLEM — R07.89 ATYPICAL CHEST PAIN: Status: RESOLVED | Noted: 2023-02-14 | Resolved: 2023-08-15

## 2023-08-15 LAB
BATTERY VOLTAGE (V): 2.95 V
ERI (V): 2.83 V
OHS CV DC PP MS1: 0.4 MS
OHS CV DC PP MS2: 0.4 MS
OHS CV DC PP V1: 1.5 V
OHS CV DC PP V2: 2 V

## 2023-08-15 PROCEDURE — 93010 EKG 12-LEAD: ICD-10-PCS | Mod: S$PBB,,, | Performed by: INTERNAL MEDICINE

## 2023-08-15 PROCEDURE — 99214 PR OFFICE/OUTPT VISIT, EST, LEVL IV, 30-39 MIN: ICD-10-PCS | Mod: S$PBB,,, | Performed by: NURSE PRACTITIONER

## 2023-08-15 PROCEDURE — 93005 ELECTROCARDIOGRAM TRACING: CPT | Mod: PBBFAC,59 | Performed by: INTERNAL MEDICINE

## 2023-08-15 PROCEDURE — 93294 REM INTERROG EVL PM/LDLS PM: CPT | Mod: ,,, | Performed by: INTERNAL MEDICINE

## 2023-08-15 PROCEDURE — 93296 REM INTERROG EVL PM/IDS: CPT

## 2023-08-15 PROCEDURE — 99214 OFFICE O/P EST MOD 30 MIN: CPT | Mod: PBBFAC | Performed by: NURSE PRACTITIONER

## 2023-08-15 PROCEDURE — 99214 OFFICE O/P EST MOD 30 MIN: CPT | Mod: S$PBB,,, | Performed by: NURSE PRACTITIONER

## 2023-08-15 PROCEDURE — 93010 ELECTROCARDIOGRAM REPORT: CPT | Mod: S$PBB,,, | Performed by: INTERNAL MEDICINE

## 2023-08-15 PROCEDURE — 93294 CARDIAC DEVICE CHECK - REMOTE: ICD-10-PCS | Mod: ,,, | Performed by: INTERNAL MEDICINE

## 2023-08-15 NOTE — PROGRESS NOTES
PCP: Shivam Payne MD    Referring Provider:     Subjective:   Pro Aguirre is a 68 y.o. male with hx of mild, non-obstructive CAD, HTN, HLD, COPD, DM, and pA fib,  who presents for routine follow up. He states he is doing well and denies complaints.          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    [facility] 8/31/15 Mild CAD with normal LVSF and size.     9/17/2015 implantation of  dual chamber Mri compatible ppm    Lab Results   Component Value Date     (L) 02/10/2023    K 4.8 02/10/2023    CL 99 02/10/2023    CO2 30 02/10/2023    BUN 29 (H) 02/10/2023    CREATININE 1.00 02/10/2023    CALCIUM 9.5 02/10/2023    ANIONGAP 9 02/10/2023    EGFRNONAA 79 08/11/2021       Lab Results   Component Value Date    CHOL 107 02/10/2023    CHOL 127 05/13/2022    CHOL 211 (H) 02/11/2022     Lab Results   Component Value Date    HDL 28 (L) 02/10/2023    HDL 25 (L) 05/13/2022    HDL 28 (L) 02/11/2022     Lab Results   Component Value Date    LDLCALC 41 02/10/2023    LDLCALC 39 05/13/2022    LDLCALC 112 02/11/2022     Lab Results   Component Value Date    TRIG 188 (H) 02/10/2023    TRIG 317 (H) 05/13/2022    TRIG 356 (H) 02/11/2022     Lab Results   Component Value Date    CHOLHDL 3.8 02/10/2023    CHOLHDL 5.1 05/13/2022    CHOLHDL 7.5 02/11/2022       Lab Results   Component Value Date    WBC 7.15 02/10/2023    HGB 12.7 (L) 02/10/2023    HCT 40.2 02/10/2023    MCV 89.3 02/10/2023      02/10/2023           Current Outpatient Medications:     aspirin (ECOTRIN) 81 MG EC tablet, Take 81 mg by mouth once daily., Disp: , Rfl:     atorvastatin (LIPITOR) 10 MG tablet, Take 1 tablet (10 mg total) by mouth every evening., Disp: 90 tablet, Rfl: 1    carvediloL (COREG) 25 MG tablet, Take 1 tablet (25 mg total) by mouth 2 (two) times daily., Disp: 180 tablet, Rfl: 1    ELIQUIS 5 mg Tab, Take 1 tablet (5 mg total) by mouth 2 (two) times daily., Disp: 180 tablet, Rfl: 1    FARXIGA 10 mg tablet, Take 1 tablet (10 mg total) by mouth Daily., Disp: 90 tablet, Rfl: 1    glimepiride (AMARYL) 4 MG tablet, TAKE 0.5 TABLETS (2 MILLIGRAM TOTAL) BY MOUTH 2 (TWO) TIMES DAILY., Disp: 90 tablet, Rfl: 1    JANUVIA 100 mg Tab, Take 1 tablet (100 mg total) by mouth once daily., Disp: 90 tablet, Rfl: 1    metFORMIN (GLUCOPHAGE) 1000 MG tablet, TAKE ONE (1) TABLET (1,000 MILLIGRAM TOTAL) BY MOUTH 2 (TWO) TIMES DAILY WITH MEALS., Disp: 180 tablet, Rfl: 1    multivitamin (THERAGRAN) per tablet, Take 1 tablet by mouth once daily., Disp: , Rfl:     nitroGLYCERIN (NITROSTAT) 0.4 MG SL tablet, Place 1 tablet (0.4 mg total) under the tongue every 5 (five) minutes as needed for Chest pain., Disp: 25 tablet, Rfl: 3    TRELEGY ELLIPTA 100-62.5-25 mcg DsDv, INHALE ONE (1) PUFF BY MOUTH DAILY FOR BREATHING, Disp: 28 each, Rfl: 5    zinc gluconate 50 mg tablet, Take 50 mg by mouth once daily., Disp: , Rfl:   Meds reviewed and reconciled.      Review of Systems   Respiratory:  Negative for shortness of breath.    Cardiovascular:  Negative for chest pain, palpitations, orthopnea, claudication, leg swelling and PND.   Neurological:  Negative for dizziness, loss of consciousness and weakness.           Objective:   /60   Pulse 82   Ht 6' (1.829 m)   Wt 80.7 kg (178 lb)   SpO2 96%   BMI 24.14 kg/m²     Physical Exam  Vitals and nursing note reviewed.   Constitutional:       Appearance: Normal appearance. He is obese.   HENT:      Head:  Normocephalic and atraumatic.   Neck:      Vascular: No carotid bruit.   Cardiovascular:      Rate and Rhythm: Normal rate. Rhythm irregular.      Pulses: Normal pulses.      Heart sounds: Normal heart sounds.      Comments: Well healed ppm site to left upper chest wall  Pulmonary:      Effort: Pulmonary effort is normal.      Breath sounds: Normal breath sounds.   Abdominal:      Palpations: Abdomen is soft.   Musculoskeletal:      Cervical back: Neck supple.      Right lower leg: No edema.      Left lower leg: No edema.   Skin:     General: Skin is warm and dry.      Capillary Refill: Capillary refill takes less than 2 seconds.   Neurological:      Mental Status: He is alert.           Assessment:     1. Paroxysmal atrial fibrillation  EKG 12-lead      2. Complete AV block        3. Coronary artery disease involving native coronary artery of native heart without angina pectoris        4. Cardiac pacemaker in situ        5. Hypertension, unspecified type        6. Hyperlipidemia, unspecified hyperlipidemia type              Plan:   Atrial fibrillation  Paroxysmal;   Eliquis for CVA prophylaxis    Complete AV block  S/p ppm     Coronary artery disease  asymptomatic  Continue ASA/Lipitor    Cardiac pacemaker in situ  Continue ppm interrogations as scheduled.      Hypertension  Recheck 150/70 mmHg  122/60 mmHg    Hyperlipidemia  Lipid panel results reviewed from 2/10/2023    Trig 188  HDL 28  LDL 41  Lipitor 10 mg po daily  Lipids followed by PCP    RTC: 6 months

## 2023-08-15 NOTE — ASSESSMENT & PLAN NOTE
Lipid panel results reviewed from 2/10/2023    Trig 188  HDL 28  LDL 41  Lipitor 10 mg po daily  Lipids followed by PCP

## 2023-10-09 LAB
LEFT EYE DM RETINOPATHY: NEGATIVE
RIGHT EYE DM RETINOPATHY: NEGATIVE

## 2023-11-15 ENCOUNTER — HOSPITAL ENCOUNTER (OUTPATIENT)
Dept: CARDIOLOGY | Facility: HOSPITAL | Age: 68
Discharge: HOME | End: 2023-11-15
Attending: INTERNAL MEDICINE
Payer: MEDICARE

## 2023-11-15 DIAGNOSIS — Z95.0 PRESENCE OF CARDIAC PACEMAKER: ICD-10-CM

## 2023-11-15 PROCEDURE — 93280 PM DEVICE PROGR EVAL DUAL: CPT | Mod: 26,,, | Performed by: INTERNAL MEDICINE

## 2023-11-15 PROCEDURE — 93280 PM DEVICE PROGR EVAL DUAL: CPT

## 2023-11-15 PROCEDURE — 93280 CARDIAC DEVICE CHECK - IN CLINIC & HOSPITAL: ICD-10-PCS | Mod: 26,,, | Performed by: INTERNAL MEDICINE

## 2023-12-09 DIAGNOSIS — Z71.89 COMPLEX CARE COORDINATION: ICD-10-CM

## 2023-12-14 DIAGNOSIS — E11.9 TYPE 2 DIABETES MELLITUS WITHOUT COMPLICATION, WITHOUT LONG-TERM CURRENT USE OF INSULIN: ICD-10-CM

## 2023-12-14 RX ORDER — METFORMIN HYDROCHLORIDE 1000 MG/1
TABLET ORAL
Qty: 180 TABLET | Refills: 1 | Status: SHIPPED | OUTPATIENT
Start: 2023-12-14

## 2024-01-16 DIAGNOSIS — E11.9 TYPE 2 DIABETES MELLITUS WITHOUT COMPLICATION, WITHOUT LONG-TERM CURRENT USE OF INSULIN: ICD-10-CM

## 2024-01-17 DIAGNOSIS — I10 HYPERTENSION, UNSPECIFIED TYPE: ICD-10-CM

## 2024-01-17 RX ORDER — CARVEDILOL 25 MG/1
TABLET ORAL
Qty: 180 TABLET | Refills: 0 | Status: SHIPPED | OUTPATIENT
Start: 2024-01-17 | End: 2024-02-09 | Stop reason: SDUPTHER

## 2024-01-17 RX ORDER — DAPAGLIFLOZIN 10 MG/1
10 TABLET, FILM COATED ORAL DAILY
Qty: 90 TABLET | Refills: 0 | Status: SHIPPED | OUTPATIENT
Start: 2024-01-17

## 2024-02-09 ENCOUNTER — OFFICE VISIT (OUTPATIENT)
Dept: FAMILY MEDICINE | Facility: CLINIC | Age: 69
End: 2024-02-09
Payer: MEDICARE

## 2024-02-09 VITALS
WEIGHT: 185 LBS | HEART RATE: 77 BPM | RESPIRATION RATE: 16 BRPM | BODY MASS INDEX: 25.06 KG/M2 | TEMPERATURE: 98 F | OXYGEN SATURATION: 100 % | DIASTOLIC BLOOD PRESSURE: 78 MMHG | HEIGHT: 72 IN | SYSTOLIC BLOOD PRESSURE: 136 MMHG

## 2024-02-09 DIAGNOSIS — I25.118 CORONARY ARTERY DISEASE OF NATIVE ARTERY OF NATIVE HEART WITH STABLE ANGINA PECTORIS: ICD-10-CM

## 2024-02-09 DIAGNOSIS — I10 HYPERTENSION, UNSPECIFIED TYPE: ICD-10-CM

## 2024-02-09 DIAGNOSIS — Z79.899 LONG-TERM USE OF HIGH-RISK MEDICATION: ICD-10-CM

## 2024-02-09 DIAGNOSIS — Z12.5 SCREENING PSA (PROSTATE SPECIFIC ANTIGEN): ICD-10-CM

## 2024-02-09 DIAGNOSIS — I25.10 CORONARY ARTERY DISEASE INVOLVING NATIVE CORONARY ARTERY OF NATIVE HEART WITHOUT ANGINA PECTORIS: ICD-10-CM

## 2024-02-09 DIAGNOSIS — E11.9 TYPE 2 DIABETES MELLITUS WITHOUT COMPLICATION, WITHOUT LONG-TERM CURRENT USE OF INSULIN: Primary | ICD-10-CM

## 2024-02-09 DIAGNOSIS — K75.81 NASH (NONALCOHOLIC STEATOHEPATITIS): Chronic | ICD-10-CM

## 2024-02-09 DIAGNOSIS — I44.2 COMPLETE AV BLOCK: ICD-10-CM

## 2024-02-09 DIAGNOSIS — I48.0 PAROXYSMAL ATRIAL FIBRILLATION: Chronic | ICD-10-CM

## 2024-02-09 DIAGNOSIS — J43.1 PANLOBULAR EMPHYSEMA: ICD-10-CM

## 2024-02-09 DIAGNOSIS — E78.5 HYPERLIPIDEMIA, UNSPECIFIED HYPERLIPIDEMIA TYPE: ICD-10-CM

## 2024-02-09 LAB
25(OH)D3 SERPL-MCNC: 39.4 NG/ML
ALBUMIN SERPL BCP-MCNC: 3.7 G/DL (ref 3.5–5)
ALP SERPL-CCNC: 54 U/L (ref 45–115)
ALT SERPL W P-5'-P-CCNC: 33 U/L (ref 16–61)
ANION GAP SERPL CALCULATED.3IONS-SCNC: 10 MMOL/L (ref 7–16)
AST SERPL W P-5'-P-CCNC: 30 U/L (ref 15–37)
BASOPHILS # BLD AUTO: 0.07 K/UL (ref 0–0.2)
BASOPHILS NFR BLD AUTO: 0.9 % (ref 0–1)
BILIRUB DIRECT SERPL-MCNC: 0.1 MG/DL (ref 0–0.2)
BILIRUB SERPL-MCNC: 0.4 MG/DL (ref ?–1.2)
BUN SERPL-MCNC: 22 MG/DL (ref 7–18)
BUN/CREAT SERPL: 26 (ref 6–20)
CALCIUM SERPL-MCNC: 9.3 MG/DL (ref 8.5–10.1)
CHLORIDE SERPL-SCNC: 101 MMOL/L (ref 98–107)
CHOLEST SERPL-MCNC: 109 MG/DL (ref 0–200)
CHOLEST/HDLC SERPL: 4 {RATIO}
CO2 SERPL-SCNC: 28 MMOL/L (ref 21–32)
CREAT SERPL-MCNC: 0.84 MG/DL (ref 0.7–1.3)
DIFFERENTIAL METHOD BLD: ABNORMAL
EGFR (NO RACE VARIABLE) (RUSH/TITUS): 94 ML/MIN/1.73M2
EOSINOPHIL # BLD AUTO: 0.2 K/UL (ref 0–0.5)
EOSINOPHIL NFR BLD AUTO: 2.5 % (ref 1–4)
ERYTHROCYTE [DISTWIDTH] IN BLOOD BY AUTOMATED COUNT: 15.8 % (ref 11.5–14.5)
EST. AVERAGE GLUCOSE BLD GHB EST-MCNC: 154 MG/DL
GLUCOSE SERPL-MCNC: 160 MG/DL (ref 74–106)
HBA1C MFR BLD HPLC: 7 % (ref 4.5–6.6)
HCT VFR BLD AUTO: 39.1 % (ref 40–54)
HDLC SERPL-MCNC: 27 MG/DL (ref 40–60)
HGB BLD-MCNC: 12.6 G/DL (ref 13.5–18)
IMM GRANULOCYTES # BLD AUTO: 0.09 K/UL (ref 0–0.04)
IMM GRANULOCYTES NFR BLD: 1.1 % (ref 0–0.4)
LDLC SERPL CALC-MCNC: 43 MG/DL
LDLC/HDLC SERPL: 1.6 {RATIO}
LYMPHOCYTES # BLD AUTO: 1.37 K/UL (ref 1–4.8)
LYMPHOCYTES NFR BLD AUTO: 17 % (ref 27–41)
MCH RBC QN AUTO: 28.4 PG (ref 27–31)
MCHC RBC AUTO-ENTMCNC: 32.2 G/DL (ref 32–36)
MCV RBC AUTO: 88.1 FL (ref 80–96)
MONOCYTES # BLD AUTO: 0.7 K/UL (ref 0–0.8)
MONOCYTES NFR BLD AUTO: 8.7 % (ref 2–6)
MPC BLD CALC-MCNC: 11.8 FL (ref 9.4–12.4)
NEUTROPHILS # BLD AUTO: 5.62 K/UL (ref 1.8–7.7)
NEUTROPHILS NFR BLD AUTO: 69.8 % (ref 53–65)
NONHDLC SERPL-MCNC: 82 MG/DL
NRBC # BLD AUTO: 0 X10E3/UL
NRBC, AUTO (.00): 0 %
PLATELET # BLD AUTO: 208 K/UL (ref 150–400)
POTASSIUM SERPL-SCNC: 4.7 MMOL/L (ref 3.5–5.1)
PROT SERPL-MCNC: 7.7 G/DL (ref 6.4–8.2)
PSA SERPL-MCNC: 0.26 NG/ML
RBC # BLD AUTO: 4.44 M/UL (ref 4.6–6.2)
SODIUM SERPL-SCNC: 134 MMOL/L (ref 136–145)
TRIGL SERPL-MCNC: 195 MG/DL (ref 35–150)
VIT B12 SERPL-MCNC: 758 PG/ML (ref 193–986)
VLDLC SERPL-MCNC: 39 MG/DL
WBC # BLD AUTO: 8.05 K/UL (ref 4.5–11)

## 2024-02-09 PROCEDURE — 99214 OFFICE O/P EST MOD 30 MIN: CPT | Mod: ,,, | Performed by: INTERNAL MEDICINE

## 2024-02-09 PROCEDURE — 80048 BASIC METABOLIC PNL TOTAL CA: CPT | Mod: ,,, | Performed by: CLINICAL MEDICAL LABORATORY

## 2024-02-09 PROCEDURE — 82306 VITAMIN D 25 HYDROXY: CPT | Mod: ,,, | Performed by: CLINICAL MEDICAL LABORATORY

## 2024-02-09 PROCEDURE — 83036 HEMOGLOBIN GLYCOSYLATED A1C: CPT | Mod: ,,, | Performed by: CLINICAL MEDICAL LABORATORY

## 2024-02-09 PROCEDURE — 80076 HEPATIC FUNCTION PANEL: CPT | Mod: ,,, | Performed by: CLINICAL MEDICAL LABORATORY

## 2024-02-09 PROCEDURE — G0103 PSA SCREENING: HCPCS | Mod: ,,, | Performed by: CLINICAL MEDICAL LABORATORY

## 2024-02-09 PROCEDURE — 82607 VITAMIN B-12: CPT | Mod: ,,, | Performed by: CLINICAL MEDICAL LABORATORY

## 2024-02-09 PROCEDURE — 85025 COMPLETE CBC W/AUTO DIFF WBC: CPT | Mod: ,,, | Performed by: CLINICAL MEDICAL LABORATORY

## 2024-02-09 PROCEDURE — 80061 LIPID PANEL: CPT | Mod: ,,, | Performed by: CLINICAL MEDICAL LABORATORY

## 2024-02-09 RX ORDER — CARVEDILOL 25 MG/1
TABLET ORAL
Qty: 180 TABLET | Refills: 1 | Status: SHIPPED | OUTPATIENT
Start: 2024-02-09

## 2024-02-09 RX ORDER — APIXABAN 5 MG/1
5 TABLET, FILM COATED ORAL 2 TIMES DAILY
Qty: 180 TABLET | Refills: 1 | Status: SHIPPED | OUTPATIENT
Start: 2024-02-09

## 2024-02-09 RX ORDER — ATORVASTATIN CALCIUM 10 MG/1
10 TABLET, FILM COATED ORAL NIGHTLY
Qty: 90 TABLET | Refills: 1 | Status: SHIPPED | OUTPATIENT
Start: 2024-02-09

## 2024-02-09 RX ORDER — GLIMEPIRIDE 4 MG/1
TABLET ORAL
Qty: 90 TABLET | Refills: 1 | Status: SHIPPED | OUTPATIENT
Start: 2024-02-09

## 2024-02-09 RX ORDER — SITAGLIPTIN 100 MG/1
100 TABLET, FILM COATED ORAL DAILY
Qty: 90 TABLET | Refills: 1 | Status: SHIPPED | OUTPATIENT
Start: 2024-02-09

## 2024-02-09 NOTE — PROGRESS NOTES
New Clinic Note    Patient Name:  Pro Aguirre is a 68 y.o. male     Chief Complaint:    Chief Complaint   Patient presents with    Follow-up     Patient is here for his checkup today.         Subjective  HPI         Current Outpatient Medications:     aspirin (ECOTRIN) 81 MG EC tablet, Take 81 mg by mouth once daily., Disp: , Rfl:     FARXIGA 10 mg tablet, Take 1 tablet (10 mg total) by mouth Daily., Disp: 90 tablet, Rfl: 0    metFORMIN (GLUCOPHAGE) 1000 MG tablet, TAKE ONE (1) TABLET (1,000 MILLIGRAM TOTAL) BY MOUTH 2 (TWO) TIMES DAILY WITH MEALS., Disp: 180 tablet, Rfl: 1    multivitamin (THERAGRAN) per tablet, Take 1 tablet by mouth once daily., Disp: , Rfl:     nitroGLYCERIN (NITROSTAT) 0.4 MG SL tablet, Place 1 tablet (0.4 mg total) under the tongue every 5 (five) minutes as needed for Chest pain., Disp: 25 tablet, Rfl: 3    TRELEGY ELLIPTA 100-62.5-25 mcg DsDv, INHALE ONE (1) PUFF BY MOUTH DAILY FOR BREATHING, Disp: 28 each, Rfl: 5    zinc gluconate 50 mg tablet, Take 50 mg by mouth once daily., Disp: , Rfl:     atorvastatin (LIPITOR) 10 MG tablet, Take 1 tablet (10 mg total) by mouth every evening., Disp: 90 tablet, Rfl: 1    carvediloL (COREG) 25 MG tablet, TAKE ONE (1) TABLET BY MOUTH TWICE DAILY FOR BLOOD PRESSURE, Disp: 180 tablet, Rfl: 1    ELIQUIS 5 mg Tab, Take 1 tablet (5 mg total) by mouth 2 (two) times daily., Disp: 180 tablet, Rfl: 1    glimepiride (AMARYL) 4 MG tablet, TAKE 0.5 TABLETS (2 MILLIGRAM TOTAL) BY MOUTH 2 (TWO) TIMES DAILY., Disp: 90 tablet, Rfl: 1    JANUVIA 100 mg Tab, Take 1 tablet (100 mg total) by mouth once daily., Disp: 90 tablet, Rfl: 1   Past Medical History:   Diagnosis Date    ARF (acute renal failure)     Atrial fibrillation     Carotid artery occlusion     normal dopplers 8/20    COPD (chronic obstructive pulmonary disease)     Coronary artery disease     Diabetes mellitus, type 2     GERD (gastroesophageal reflux disease)     H/O iron deficiency anemia      History of pericarditis     Hyperlipidemia     Hypertension     GARCÍA (nonalcoholic steatohepatitis)       Past Surgical History:   Procedure Laterality Date    APPENDECTOMY      TONSILLECTOMY        Family History   Problem Relation Age of Onset    Hypertension Mother     Diabetes Mother       Social History     Tobacco Use    Smoking status: Never     Passive exposure: Never    Smokeless tobacco: Current     Types: Snuff   Substance Use Topics    Alcohol use: Not Currently    Drug use: Never        Review of Systems     Objective:  /78 (BP Location: Left arm, Patient Position: Sitting) Comment: manual  Pulse 77   Temp 97.9 °F (36.6 °C) (Oral)   Resp 16   Ht 6' (1.829 m)   Wt 83.9 kg (185 lb)   SpO2 100%   BMI 25.09 kg/m²      Physical Exam     Assessment and Plan    Type 2 diabetes mellitus without complication, without long-term current use of insulin  -     glimepiride (AMARYL) 4 MG tablet; TAKE 0.5 TABLETS (2 MILLIGRAM TOTAL) BY MOUTH 2 (TWO) TIMES DAILY.  Dispense: 90 tablet; Refill: 1  -     JANUVIA 100 mg Tab; Take 1 tablet (100 mg total) by mouth once daily.  Dispense: 90 tablet; Refill: 1  -     Hemoglobin A1C; Future; Expected date: 02/09/2024    Hypertension, unspecified type  -     carvediloL (COREG) 25 MG tablet; TAKE ONE (1) TABLET BY MOUTH TWICE DAILY FOR BLOOD PRESSURE  Dispense: 180 tablet; Refill: 1  -     Basic Metabolic Panel; Future; Expected date: 02/09/2024    Hyperlipidemia, unspecified hyperlipidemia type  -     atorvastatin (LIPITOR) 10 MG tablet; Take 1 tablet (10 mg total) by mouth every evening.  Dispense: 90 tablet; Refill: 1  -     Hepatic Function Panel; Future; Expected date: 02/09/2024  -     Lipid Panel; Future; Expected date: 02/09/2024    Coronary artery disease involving native coronary artery of native heart without angina pectoris  -     ELIQUIS 5 mg Tab; Take 1 tablet (5 mg total) by mouth 2 (two) times daily.  Dispense: 180 tablet; Refill: 1  -     CBC Auto  Differential; Future; Expected date: 02/09/2024    Long-term use of high-risk medication  -     Vitamin B12; Future; Expected date: 02/09/2024  -     Vitamin D; Future; Expected date: 02/09/2024    Screening PSA (prostate specific antigen)  -     PSA, Screening; Future; Expected date: 02/09/2024    Panlobular emphysema    Complete AV block    Coronary artery disease of native artery of native heart with stable angina pectoris    Paroxysmal atrial fibrillation    GARCÍA (nonalcoholic steatohepatitis)         Problem List Items Addressed This Visit          Pulmonary    COPD (chronic obstructive pulmonary disease) (Chronic)       Cardiac/Vascular    Complete AV block (Chronic)    Overview     S/p ppm 9/17/2015         Hypertension (Chronic)    Relevant Medications    carvediloL (COREG) 25 MG tablet    Other Relevant Orders    Basic Metabolic Panel    Hyperlipidemia (Chronic)    Relevant Medications    atorvastatin (LIPITOR) 10 MG tablet    Other Relevant Orders    Hepatic Function Panel    Lipid Panel    Atrial fibrillation (Chronic)    Overview     Paroxysmal; cva prophylaxis with Eliquis         Coronary artery disease of native artery of native heart with stable angina pectoris    Overview     Very mild CAD by [facility] 8/31/2015         Relevant Medications    ELIQUIS 5 mg Tab       Endocrine    Diabetes mellitus, type 2 - Primary (Chronic)    Relevant Medications    glimepiride (AMARYL) 4 MG tablet    JANUVIA 100 mg Tab    Other Relevant Orders    Hemoglobin A1C       GI    GARCÍA (nonalcoholic steatohepatitis) (Chronic)     Other Visit Diagnoses       Long-term use of high-risk medication        Relevant Orders    Vitamin B12    Vitamin D    Screening PSA (prostate specific antigen)        Relevant Orders    PSA, Screening         1-DMII-A1c  2-HTN controlled on recheck-bmp  3-CAD with AV block/PAF-has pacemaker, on Eliquis doing well  4-screening PSA  5-Hyperlipidemia-lipid liver  6-COPD stable  7-GARCÍA has been  stable    Follow up in about 6 months (around 8/9/2024).

## 2024-02-14 ENCOUNTER — HOSPITAL ENCOUNTER (OUTPATIENT)
Dept: CARDIOLOGY | Facility: HOSPITAL | Age: 69
Discharge: HOME | End: 2024-02-14
Attending: INTERNAL MEDICINE
Payer: MEDICARE

## 2024-02-14 DIAGNOSIS — Z95.0 PRESENCE OF CARDIAC PACEMAKER: ICD-10-CM

## 2024-02-14 DIAGNOSIS — Z95.0 PRESENCE OF CARDIAC PACEMAKER: Primary | ICD-10-CM

## 2024-02-14 PROCEDURE — 93296 REM INTERROG EVL PM/IDS: CPT

## 2024-02-14 PROCEDURE — 93294 REM INTERROG EVL PM/LDLS PM: CPT | Mod: ,,, | Performed by: INTERNAL MEDICINE

## 2024-02-15 ENCOUNTER — OFFICE VISIT (OUTPATIENT)
Dept: CARDIOLOGY | Facility: CLINIC | Age: 69
End: 2024-02-15
Payer: MEDICARE

## 2024-02-15 VITALS
WEIGHT: 184.38 LBS | HEART RATE: 79 BPM | DIASTOLIC BLOOD PRESSURE: 74 MMHG | BODY MASS INDEX: 24.97 KG/M2 | OXYGEN SATURATION: 98 % | HEIGHT: 72 IN | SYSTOLIC BLOOD PRESSURE: 127 MMHG

## 2024-02-15 DIAGNOSIS — I10 HYPERTENSION, UNSPECIFIED TYPE: Primary | ICD-10-CM

## 2024-02-15 DIAGNOSIS — I48.0 PAROXYSMAL ATRIAL FIBRILLATION: Chronic | ICD-10-CM

## 2024-02-15 DIAGNOSIS — I25.118 CORONARY ARTERY DISEASE OF NATIVE ARTERY OF NATIVE HEART WITH STABLE ANGINA PECTORIS: ICD-10-CM

## 2024-02-15 DIAGNOSIS — I44.2 COMPLETE AV BLOCK: Chronic | ICD-10-CM

## 2024-02-15 DIAGNOSIS — E78.5 HYPERLIPIDEMIA, UNSPECIFIED HYPERLIPIDEMIA TYPE: Chronic | ICD-10-CM

## 2024-02-15 DIAGNOSIS — Z95.0 CARDIAC PACEMAKER IN SITU: ICD-10-CM

## 2024-02-15 PROCEDURE — 99214 OFFICE O/P EST MOD 30 MIN: CPT | Mod: PBBFAC | Performed by: NURSE PRACTITIONER

## 2024-02-15 PROCEDURE — 93010 ELECTROCARDIOGRAM REPORT: CPT | Mod: S$PBB,,, | Performed by: INTERNAL MEDICINE

## 2024-02-15 PROCEDURE — 99214 OFFICE O/P EST MOD 30 MIN: CPT | Mod: S$PBB,,, | Performed by: NURSE PRACTITIONER

## 2024-02-15 PROCEDURE — 93005 ELECTROCARDIOGRAM TRACING: CPT | Mod: PBBFAC | Performed by: INTERNAL MEDICINE

## 2024-02-15 NOTE — PROGRESS NOTES
PCP: Shivam Payne MD    Referring Provider:     Subjective:   Pro Aguirre is a 68 y.o. male with hx of mild, non-obstructive CAD, HTN, HLD, COPD, DM, and pA fib,  who presents for routine follow up. He states he is doing well and denies complaints.     8/15/2023 presents for routine follow up. He states he is doing well and denies complaints.          Fhx:  Family History   Problem Relation Age of Onset    Hypertension Mother     Diabetes Mother      Shx:   Social History     Socioeconomic History    Marital status:    Tobacco Use    Smoking status: Never     Passive exposure: Never    Smokeless tobacco: Current     Types: Snuff   Substance and Sexual Activity    Alcohol use: Not Currently    Drug use: Never       EKG 2/15/2024 a paced rhythm; left axis deviation; RBBB; possible lateral infarct; inferior infarct (cite don or bfore 7/27/2021) when compared to previous EKG a paced rhythm has replaced sinus rhythm  8/15/2023 RSR with HR 81 bpm; left axis deviation; RBBB: old inferior infarct  2/13/2023 RSR with PAC's; HR 89 bpm; left axis deviation; RBBB; old inferior infarct    ECHO No results found for this or any previous visit.    [facility] 8/31/15 Mild CAD with normal LVSF and size.     9/17/2015 implantation of  dual chamber Mri compatible ppm    Lab Results   Component Value Date     (L) 02/09/2024    K 4.7 02/09/2024     02/09/2024    CO2 28 02/09/2024    BUN 22 (H) 02/09/2024    CREATININE 0.84 02/09/2024    CALCIUM 9.3 02/09/2024    ANIONGAP 10 02/09/2024    EGFRNONAA 79 08/11/2021       Lab Results   Component Value Date    CHOL 109 02/09/2024    CHOL 107 02/10/2023    CHOL 127 05/13/2022     Lab Results   Component Value Date    HDL 27 (L) 02/09/2024    HDL 28 (L) 02/10/2023    HDL 25 (L) 05/13/2022     Lab Results   Component Value Date    LDLCALC 43 02/09/2024    LDLCALC 41 02/10/2023    LDLCALC 39 05/13/2022     Lab Results   Component Value Date    TRIG 195 (H) 02/09/2024    TRIG  188 (H) 02/10/2023    TRIG 317 (H) 05/13/2022     Lab Results   Component Value Date    CHOLHDL 4.0 02/09/2024    CHOLHDL 3.8 02/10/2023    CHOLHDL 5.1 05/13/2022       Lab Results   Component Value Date    WBC 8.05 02/09/2024    HGB 12.6 (L) 02/09/2024    HCT 39.1 (L) 02/09/2024    MCV 88.1 02/09/2024     02/09/2024           Current Outpatient Medications:     aspirin (ECOTRIN) 81 MG EC tablet, Take 81 mg by mouth once daily., Disp: , Rfl:     atorvastatin (LIPITOR) 10 MG tablet, Take 1 tablet (10 mg total) by mouth every evening., Disp: 90 tablet, Rfl: 1    carvediloL (COREG) 25 MG tablet, TAKE ONE (1) TABLET BY MOUTH TWICE DAILY FOR BLOOD PRESSURE, Disp: 180 tablet, Rfl: 1    ELIQUIS 5 mg Tab, Take 1 tablet (5 mg total) by mouth 2 (two) times daily., Disp: 180 tablet, Rfl: 1    FARXIGA 10 mg tablet, Take 1 tablet (10 mg total) by mouth Daily., Disp: 90 tablet, Rfl: 0    glimepiride (AMARYL) 4 MG tablet, TAKE 0.5 TABLETS (2 MILLIGRAM TOTAL) BY MOUTH 2 (TWO) TIMES DAILY., Disp: 90 tablet, Rfl: 1    JANUVIA 100 mg Tab, Take 1 tablet (100 mg total) by mouth once daily., Disp: 90 tablet, Rfl: 1    metFORMIN (GLUCOPHAGE) 1000 MG tablet, TAKE ONE (1) TABLET (1,000 MILLIGRAM TOTAL) BY MOUTH 2 (TWO) TIMES DAILY WITH MEALS., Disp: 180 tablet, Rfl: 1    multivitamin (THERAGRAN) per tablet, Take 1 tablet by mouth once daily., Disp: , Rfl:     nitroGLYCERIN (NITROSTAT) 0.4 MG SL tablet, Place 1 tablet (0.4 mg total) under the tongue every 5 (five) minutes as needed for Chest pain., Disp: 25 tablet, Rfl: 3    TRELEGY ELLIPTA 100-62.5-25 mcg DsDv, INHALE ONE (1) PUFF BY MOUTH DAILY FOR BREATHING, Disp: 28 each, Rfl: 5    zinc gluconate 50 mg tablet, Take 50 mg by mouth once daily., Disp: , Rfl:   Meds reviewed and reconciled.      Review of Systems   Respiratory:  Negative for shortness of breath.    Cardiovascular:  Negative for chest pain, palpitations, orthopnea, claudication, leg swelling and PND.   Neurological:   Date of service: 05-29-20 @ 08:04    Lying in bed in NAD  Has low grade fever  No rashes  Confused    ROS: poorly verbal    MEDICATIONS  (STANDING):  ALBUTerol    90 MICROgram(s) HFA Inhaler 2 Puff(s) Inhalation every 4 hours  ALPRAZolam 0.5 milliGRAM(s) Oral every 12 hours  aspirin  chewable 81 milliGRAM(s) Oral daily  budesonide 160 MICROgram(s)/formoterol 4.5 MICROgram(s) Inhaler 2 Puff(s) Inhalation two times a day  ceFAZolin   IVPB 2000 milliGRAM(s) IV Intermittent every 8 hours  chlorhexidine 0.12% Liquid 15 milliLiter(s) Oral Mucosa every 12 hours  clopidogrel Tablet 75 milliGRAM(s) Oral daily  diltiazem    Tablet 90 milliGRAM(s) Oral every 6 hours  doxazosin 2 milliGRAM(s) Oral at bedtime  enoxaparin Injectable 40 milliGRAM(s) SubCutaneous every 12 hours  gabapentin 400 milliGRAM(s) Oral three times a day  pantoprazole  Injectable 40 milliGRAM(s) IV Push daily  QUEtiapine 50 milliGRAM(s) Oral every 12 hours  thiamine 100 milliGRAM(s) Oral daily  tiotropium 18 MICROgram(s) Capsule 1 Capsule(s) Inhalation daily      Vital Signs Last 24 Hrs  T(C): 38.3 (29 May 2020 05:00), Max: 38.3 (29 May 2020 05:00)  T(F): 100.9 (29 May 2020 05:00), Max: 100.9 (29 May 2020 05:00)  HR: 115 (29 May 2020 07:00) (79 - 127)  BP: 115/60 (29 May 2020 07:00) (106/88 - 145/76)  BP(mean): 71 (29 May 2020 07:00) (51 - 117)  RR: 24 (29 May 2020 07:00) (0 - 34)  SpO2: 100% (29 May 2020 07:00) (88% - 100%)    Mode: AC/ CMV (Assist Control/ Continuous Mandatory Ventilation), RR (machine): 15, TV (machine): 450, FiO2: 40, PEEP: 5, ITime: 1, MAP: 13, PIP: 34    Physical exam:    Constitutional:  No acute distress  HEENT: NC/AT, EOMI, PERRLA, conjunctivae clear  Neck: supple; thyroid not palpable  Back: no tenderness  Respiratory: respiratory effort normal; b/l rhonchi  Cardiovascular: S1S2 regular, no murmurs  Abdomen: soft, not tender, not distended, positive BS  Genitourinary: no suprapubic tenderness  Lymphatic: no LN palpable  Musculoskeletal: no muscle tenderness, no joint swelling or tenderness  Extremities: no pedal edema  Right arm area of erythema with phlebitis - resolving  Neurological/ Psychiatric: confused; moving all extremities  Skin: no rashes; no palpable lesions    Labs: reviewed                        8.6    7.51  )-----------( 236      ( 28 May 2020 07:27 )             27.4     05-28    135  |  102  |  10  ----------------------------<  129<H>  3.9   |  31  |  0.47<L>    Ca    8.3<L>      28 May 2020 07:27  Phos  3.5     05-28  Mg     1.5     05-28                        8.4    5.89  )-----------( 213      ( 25 May 2020 07:31 )             27.0     05-25    142  |  106  |  9   ----------------------------<  125<H>  3.7   |  32<H>  |  0.47<L>    Ca    8.5      25 May 2020 07:31  Phos  3.5     05-25  Mg     1.4     05-25               Vancomycin Level, Trough: 9.8 ug/mL (05-21 @ 04:52)                                  12.5   8.31  )-----------( 197      ( 07 May 2020 08:15 )             37.6     05-07    137  |  100  |  33<H>  ----------------------------<  114<H>  4.9   |  31  |  0.83    Ca    8.5      07 May 2020 08:15  Phos  4.2     05-07  Mg     2.4     05-07      COVID-19 PCR . (04.26.20 @ 00:02)    COVID-19 PCR: [facility]      Culture - Blood (collected 26 Apr 2020 01:01)  Source: .Blood None  Preliminary Report (27 Apr 2020 10:02):    No growth to date.    Culture - Blood (collected 26 Apr 2020 00:56)  Source: .Blood None  Preliminary Report (27 Apr 2020 10:02):    No growth to date.    Culture - Sputum (collected 25 Apr 2020 01:00)  Source: .Sputum Sputum  trap  Gram Stain (26 Apr 2020 12:29):    Moderate polymorphonuclear leukocytes per low power field    No Squamous epithelial cells per low power field    Rare Gram Positive Rods per oil power field    Rare Gram Positive Cocci in Pairs and Chains per oil power field  Final Report (28 Apr 2020 10:53):    Few Pseudomonas aeruginosa (Carbapenem Resistant)    Moderate Methicillin resistant Staphylococcus aureus    Normal Respiratory Faith present  Organism: Pseudomonas aeruginosa (Carbapenem Resistant)  Methicillin resistant Staphylococcus aureus (28 Apr 2020 10:44)  Organism: Pseudomonas aeruginosa (Carbapenem Resistant) (28 Apr 2020 10:44)      -  Amikacin: S <=16      -  Aztreonam: R >16      -  Cefepime: R >16      -  Ceftazidime: R >16      -  Ciprofloxacin: S <=0.25      -  Gentamicin: S 4      -  Imipenem: R >8      -  Levofloxacin: S <=0.5      -  Meropenem: R >8      -  Piperacillin/Tazobactam: R >64      -  Tobramycin: S <=2      Method Type: LADY  Organism: Methicillin resistant Staphylococcus aureus (28 Apr 2020 10:40)      -  Amoxicillin/Clavulanic Acid: R >4/2      -  Ampicillin: R >8      -  Ampicillin/Sulbactam: R <=8/4      -  Cefazolin: R >16      -  Ceftriaxone: R >32      -  Ciprofloxacin: R >2      -  Clindamycin: R >4      -  Daptomycin: S 0.5      -  Erythromycin: R >4      -  Gentamicin: S <=1 Should not be used as monotherapy      -  Levofloxacin: R >4      -  Linezolid: S 4      -  Meropenem: R >8      -  Moxifloxacin(Aerobic): R >4      -  Oxacillin: R >2      -  Penicillin: R >8      -  RIF- Rifampin: S <=1 Should not be used as monotherapy      -  Tetra/Doxy: S 2      -  Trimethoprim/Sulfamethoxazole: S <=0.5/9.5      -  Vancomycin: S 2      Method Type: LADY    Culture - Urine (collected 21 Apr 2020 15:51)  Source: .Urine Clean Catch (Midstream)  Final Report (22 Apr 2020 17:03):    <10,000 CFU/mL Normal Urogenital Faith    Culture - Blood (collected 18 Apr 2020 19:43)  Source: .Blood Blood-Peripheral  Final Report (24 Apr 2020 01:01):    No Growth Final    Culture - Blood (05.18.20 @ 11:44)    -  Staphylococcus aureus: Detec Any isolate of Staphylococcus aureus from a blood culture is NOT considered a contaminant.    -  RIF- Rifampin: S <=1 Should not be used as monotherapy    -  Penicillin: R >8    -  Oxacillin: S <=0.25    -  Tetra/Doxy: S <=1    -  Trimethoprim/Sulfamethoxazole: S <=0.5/9.5    -  Vancomycin: S 2    -  Clindamycin: R 0.5 This isolate is presumed to be clindamycin resistant based on detection of inducible resistance. Clindamycin may still be effective in some patients.    -  Gentamicin: S <=1 Should not be used as monotherapy    -  Erythromycin: R >4    -  Ampicillin/Sulbactam: S <=8/4    -  Cefazolin: S <=4    Gram Stain:   Growth in aerobic bottle: Gram Positive Cocci in Clusters  Growth in anaerobic bottle: Gram Positive Cocci in Clusters    Specimen Source: .Blood None    Organism: Staphylococcus aureus    Organism: Staphylococcus aureus    Culture Results:   Growth in aerobic and anaerobic bottles: Staphylococcus aureus    Organism Identification: Staphylococcus aureus  Staphylococcus aureus    Method Type: LADY    Method Type: PCR        Culture - Sputum . (05.18.20 @ 09:45)    -  Tobramycin: S <=2    -  Piperacillin/Tazobactam: R >64    -  Meropenem: R >8    -  Levofloxacin: S <=0.5    Gram Stain:   Numerous polymorphonuclear leukocytes per low power field  No Squamous epithelial cells per low power field  Numerous Gram Negative Rods per oil power field    -  Amikacin: S <=16    -  Cefepime: R >16    -  Aztreonam: R >16    -  Imipenem: R >8    -  Gentamicin: S 4    -  Ciprofloxacin: S <=0.25    -  Ceftazidime: R >16    Specimen Source: .Sputum Sputums trap    Culture Results:   Numerous Pseudomonas aeruginosa (Carbapenem Resistant)  Normal Respiratory Faith absent    Organism Identification: Pseudomonas aeruginosa (Carbapenem Resistant)    Organism: Pseudomonas aeruginosa (Carbapenem Resistant)    Method Type: LADY    Culture - Blood in AM (05.20.20 @ 06:41)    Gram Stain:   Growth in aerobic bottle: Gram Positive Cocci in Clusters  Growth in anaerobic bottle: Gram Positive Cocci in Clusters    Specimen Source: .Blood None    Culture Results:   Growth in aerobic and anaerobic bottles: Staphylococcus aureus  See previous culture [id number]    Culture - Blood (05.23.20 @ 07:07)    Specimen Source: .Blood None    Culture Results:   No growth to date.    COVID-19 PCR . (05.14.20 @ 08:10)    COVID-19 PCR: NotDetec    Radiology: all available radiological tests reviewed    < from: Xray Chest 1 View- PORTABLE-Urgent (05.18.20 @ 09:49) >  A tracheostomy tube is noted.  Lungs: There are no lung consolidations.  Heart: There is cardiomegaly.  Mediastinum: The mediastinum is within normal limits.    < end of copied text >    < from: MYA Complete w/Spect and Color (05.26.20 @ 13:17) >   Mitral Valve   Structurally normalmitral valve. Moderate mitral regurgitation, no   stenosis. No evidence of vegetations.     Aortic Valve   Structurally normal trileaflet aortic valve. No aortic regurgitation or   stenosis. No evidence of vegetations.     Tricuspid Valve   Structurally normal tricuspid valve. Moderate tricuspid regurgitation, no   stenosis. No evidence of vegetations.     Pulmonic Valve   Structurally normal pulmonic valve. Mild pulmonic regurgitation, no   stenosis. No evidence of vegetations.     Left Atrium  Moderate left atrial dilation.   Left atrial appendage occlusion device ("Watchman device") well seated in   left atrial appendage. No evidence of vegetation on this device.    < end of copied text >      Advanced directives addressed: full resuscitation Negative for dizziness, loss of consciousness and weakness.           Objective:   /74 (BP Location: Left arm, Patient Position: Sitting)   Pulse 79   Ht 6' (1.829 m)   Wt 83.6 kg (184 lb 6.4 oz)   SpO2 98%   BMI 25.01 kg/m²     Physical Exam  Vitals and nursing note reviewed.   Constitutional:       Appearance: Normal appearance. He is obese.   HENT:      Head: Normocephalic and atraumatic.   Neck:      Vascular: No carotid bruit.   Cardiovascular:      Rate and Rhythm: Normal rate. Rhythm irregular.      Pulses: Normal pulses.      Heart sounds: Normal heart sounds.      Comments: Well healed ppm site to left upper chest wall  Pulmonary:      Effort: Pulmonary effort is normal.      Breath sounds: Normal breath sounds.   Abdominal:      Palpations: Abdomen is soft.   Musculoskeletal:      Cervical back: Neck supple.      Right lower leg: No edema.      Left lower leg: No edema.   Skin:     General: Skin is warm and dry.      Capillary Refill: Capillary refill takes less than 2 seconds.   Neurological:      Mental Status: He is alert.           Assessment:     1. Hypertension, unspecified type  EKG 12-lead    EKG 12-lead      2. Paroxysmal atrial fibrillation        3. Complete AV block        4. Coronary artery disease of native artery of native heart with stable angina pectoris        5. Hyperlipidemia, unspecified hyperlipidemia type        6. Cardiac pacemaker in situ              Plan:   Atrial fibrillation  Paroxysmal  CVA prophylaxis with Eliquis    Complete AV block  S/p ppm 9/17/2015    Coronary artery disease of native artery of native heart with stable angina pectoris  Very mild CAD by [facility] 8/31/2015  Continue ASA/Lipitor    Hyperlipidemia  Lab Results   Component Value Date    CHOL 109 02/09/2024    CHOL 107 02/10/2023    CHOL 127 05/13/2022     Lab Results   Component Value Date    HDL 27 (L) 02/09/2024    HDL 28 (L) 02/10/2023    HDL 25 (L) 05/13/2022     Lab Results   Component Value Date     LDLCALC 43 02/09/2024    LDLCALC 41 02/10/2023    LDLCALC 39 05/13/2022     Lab Results   Component Value Date    TRIG 195 (H) 02/09/2024    TRIG 188 (H) 02/10/2023    TRIG 317 (H) 05/13/2022       Lab Results   Component Value Date    CHOLHDL 4.0 02/09/2024    CHOLHDL 3.8 02/10/2023    CHOLHDL 5.1 05/13/2022     Lipitor 10 mg po daily  We discussed improving his triglycerides by improving his blood glucose control.    Hypertension  124/74 mmHg    Cardiac pacemaker in situ  Continue ppm interrogation as scheduled    RTC: 6 months

## 2024-02-15 NOTE — ASSESSMENT & PLAN NOTE
Lab Results   Component Value Date    CHOL 109 02/09/2024    CHOL 107 02/10/2023    CHOL 127 05/13/2022     Lab Results   Component Value Date    HDL 27 (L) 02/09/2024    HDL 28 (L) 02/10/2023    HDL 25 (L) 05/13/2022     Lab Results   Component Value Date    LDLCALC 43 02/09/2024    LDLCALC 41 02/10/2023    LDLCALC 39 05/13/2022     Lab Results   Component Value Date    TRIG 195 (H) 02/09/2024    TRIG 188 (H) 02/10/2023    TRIG 317 (H) 05/13/2022       Lab Results   Component Value Date    CHOLHDL 4.0 02/09/2024    CHOLHDL 3.8 02/10/2023    CHOLHDL 5.1 05/13/2022     Lipitor 10 mg po daily  We discussed improving his triglycerides by improving his blood glucose control.

## 2024-02-19 DIAGNOSIS — J43.1 PANLOBULAR EMPHYSEMA: ICD-10-CM

## 2024-02-19 LAB
OHS QRS DURATION: 132 MS
OHS QTC CALCULATION: 459 MS

## 2024-05-16 ENCOUNTER — HOSPITAL ENCOUNTER (OUTPATIENT)
Dept: CARDIOLOGY | Facility: HOSPITAL | Age: 69
Discharge: HOME | End: 2024-05-16
Attending: INTERNAL MEDICINE
Payer: MEDICARE

## 2024-05-16 DIAGNOSIS — Z95.0 PRESENCE OF CARDIAC PACEMAKER: ICD-10-CM

## 2024-05-16 DIAGNOSIS — Z95.0 PRESENCE OF CARDIAC PACEMAKER: Primary | ICD-10-CM

## 2024-05-16 PROCEDURE — 93296 REM INTERROG EVL PM/IDS: CPT

## 2024-05-16 PROCEDURE — 93294 REM INTERROG EVL PM/LDLS PM: CPT | Mod: ,,, | Performed by: INTERNAL MEDICINE

## 2024-05-22 DIAGNOSIS — E11.9 TYPE 2 DIABETES MELLITUS WITHOUT COMPLICATION, WITHOUT LONG-TERM CURRENT USE OF INSULIN: ICD-10-CM

## 2024-05-22 RX ORDER — METFORMIN HYDROCHLORIDE 1000 MG/1
TABLET ORAL
Qty: 180 TABLET | Refills: 1 | Status: SHIPPED | OUTPATIENT
Start: 2024-05-22

## 2024-05-28 DIAGNOSIS — I25.118 CORONARY ARTERY DISEASE OF NATIVE ARTERY OF NATIVE HEART WITH STABLE ANGINA PECTORIS: ICD-10-CM

## 2024-05-28 RX ORDER — NITROGLYCERIN 0.4 MG/1
0.4 TABLET SUBLINGUAL EVERY 5 MIN PRN
Qty: 25 TABLET | Refills: 3 | Status: SHIPPED | OUTPATIENT
Start: 2024-05-28 | End: 2025-05-28

## 2024-07-05 DIAGNOSIS — E11.9 TYPE 2 DIABETES MELLITUS WITHOUT COMPLICATION, WITHOUT LONG-TERM CURRENT USE OF INSULIN: ICD-10-CM

## 2024-07-05 RX ORDER — SITAGLIPTIN 100 MG/1
TABLET, FILM COATED ORAL
Qty: 90 TABLET | Refills: 0 | Status: SHIPPED | OUTPATIENT
Start: 2024-07-05

## 2024-07-09 DIAGNOSIS — Z71.89 COMPLEX CARE COORDINATION: ICD-10-CM

## 2024-08-09 ENCOUNTER — OFFICE VISIT (OUTPATIENT)
Dept: FAMILY MEDICINE | Facility: CLINIC | Age: 69
End: 2024-08-09
Payer: MEDICARE

## 2024-08-09 VITALS
DIASTOLIC BLOOD PRESSURE: 68 MMHG | OXYGEN SATURATION: 99 % | HEART RATE: 66 BPM | RESPIRATION RATE: 16 BRPM | SYSTOLIC BLOOD PRESSURE: 154 MMHG | HEIGHT: 72 IN | BODY MASS INDEX: 24.92 KG/M2 | TEMPERATURE: 98 F | WEIGHT: 184 LBS

## 2024-08-09 DIAGNOSIS — E11.9 TYPE 2 DIABETES MELLITUS WITHOUT COMPLICATION, WITHOUT LONG-TERM CURRENT USE OF INSULIN: Primary | ICD-10-CM

## 2024-08-09 DIAGNOSIS — I10 HYPERTENSION, UNSPECIFIED TYPE: ICD-10-CM

## 2024-08-09 DIAGNOSIS — I25.10 CORONARY ARTERY DISEASE INVOLVING NATIVE CORONARY ARTERY OF NATIVE HEART WITHOUT ANGINA PECTORIS: ICD-10-CM

## 2024-08-09 DIAGNOSIS — E78.5 HYPERLIPIDEMIA, UNSPECIFIED HYPERLIPIDEMIA TYPE: ICD-10-CM

## 2024-08-09 DIAGNOSIS — J43.1 PANLOBULAR EMPHYSEMA: ICD-10-CM

## 2024-08-09 LAB
CREAT UR-MCNC: 47 MG/DL (ref 39–259)
EST. AVERAGE GLUCOSE BLD GHB EST-MCNC: 160 MG/DL
HBA1C MFR BLD HPLC: 7.2 % (ref 4.5–6.6)
MICROALBUMIN UR-MCNC: 59.4 MG/DL (ref 0–2.8)
MICROALBUMIN/CREAT RATIO PNL UR: 1263.8 MG/G (ref 0–30)

## 2024-08-09 PROCEDURE — 82043 UR ALBUMIN QUANTITATIVE: CPT | Mod: ,,, | Performed by: CLINICAL MEDICAL LABORATORY

## 2024-08-09 PROCEDURE — 83036 HEMOGLOBIN GLYCOSYLATED A1C: CPT | Mod: ,,, | Performed by: CLINICAL MEDICAL LABORATORY

## 2024-08-09 PROCEDURE — 82570 ASSAY OF URINE CREATININE: CPT | Mod: ,,, | Performed by: CLINICAL MEDICAL LABORATORY

## 2024-08-09 RX ORDER — DAPAGLIFLOZIN 10 MG/1
10 TABLET, FILM COATED ORAL DAILY
Qty: 90 TABLET | Refills: 0 | Status: SHIPPED | OUTPATIENT
Start: 2024-08-09

## 2024-08-09 RX ORDER — ATORVASTATIN CALCIUM 10 MG/1
10 TABLET, FILM COATED ORAL NIGHTLY
Qty: 90 TABLET | Refills: 1 | Status: SHIPPED | OUTPATIENT
Start: 2024-08-09

## 2024-08-09 RX ORDER — GLIMEPIRIDE 4 MG/1
TABLET ORAL
Qty: 90 TABLET | Refills: 1 | Status: SHIPPED | OUTPATIENT
Start: 2024-08-09

## 2024-08-09 RX ORDER — FLUTICASONE FUROATE, UMECLIDINIUM BROMIDE AND VILANTEROL TRIFENATATE 100; 62.5; 25 UG/1; UG/1; UG/1
POWDER RESPIRATORY (INHALATION)
Qty: 28 EACH | Refills: 5 | Status: SHIPPED | OUTPATIENT
Start: 2024-08-09

## 2024-08-09 RX ORDER — CARVEDILOL 25 MG/1
TABLET ORAL
Qty: 180 TABLET | Refills: 1 | Status: SHIPPED | OUTPATIENT
Start: 2024-08-09

## 2024-08-09 RX ORDER — APIXABAN 5 MG/1
5 TABLET, FILM COATED ORAL 2 TIMES DAILY
Qty: 180 TABLET | Refills: 1 | Status: SHIPPED | OUTPATIENT
Start: 2024-08-09

## 2024-08-13 ENCOUNTER — PATIENT OUTREACH (OUTPATIENT)
Facility: HOSPITAL | Age: 69
End: 2024-08-13
Payer: MEDICARE

## 2024-08-13 NOTE — PROGRESS NOTES
Population Health Chart Review & Patient Outreach Details    Updates Requested / Reviewed:  [x]  Care Team Updated    Health Maintenance Topics Addressed and Outreach Outcomes / Actions Taken:  Diabetic Eye Exam [x] Hm Updated with October 2023 Eye Exam (Dr. Asencio). History Updated.

## 2024-08-14 ENCOUNTER — HOSPITAL ENCOUNTER (OUTPATIENT)
Dept: CARDIOLOGY | Facility: HOSPITAL | Age: 69
Discharge: HOME | End: 2024-08-14
Attending: INTERNAL MEDICINE
Payer: MEDICARE

## 2024-08-14 DIAGNOSIS — Z95.0 PRESENCE OF CARDIAC PACEMAKER: ICD-10-CM

## 2024-08-14 DIAGNOSIS — Z95.0 PRESENCE OF CARDIAC PACEMAKER: Primary | ICD-10-CM

## 2024-08-14 LAB
BATTERY VOLTAGE (V): 2.93 V
ERI (V): 2.83 V
OHS CV DC PP MS1: 0.4 MS
OHS CV DC PP MS2: 0.4 MS
OHS CV DC PP V1: 1.5 V
OHS CV DC PP V2: 2 V

## 2024-08-14 PROCEDURE — 93296 REM INTERROG EVL PM/IDS: CPT

## 2024-08-14 PROCEDURE — 93294 REM INTERROG EVL PM/LDLS PM: CPT | Mod: ,,, | Performed by: INTERNAL MEDICINE

## 2024-08-15 ENCOUNTER — OFFICE VISIT (OUTPATIENT)
Dept: CARDIOLOGY | Facility: CLINIC | Age: 69
End: 2024-08-15
Payer: MEDICARE

## 2024-08-15 VITALS
HEART RATE: 71 BPM | HEIGHT: 70 IN | BODY MASS INDEX: 26.05 KG/M2 | WEIGHT: 182 LBS | DIASTOLIC BLOOD PRESSURE: 70 MMHG | SYSTOLIC BLOOD PRESSURE: 120 MMHG | RESPIRATION RATE: 18 BRPM

## 2024-08-15 DIAGNOSIS — I44.2 COMPLETE AV BLOCK: Chronic | ICD-10-CM

## 2024-08-15 DIAGNOSIS — I10 HYPERTENSION, UNSPECIFIED TYPE: Chronic | ICD-10-CM

## 2024-08-15 DIAGNOSIS — I48.0 PAROXYSMAL ATRIAL FIBRILLATION: Primary | Chronic | ICD-10-CM

## 2024-08-15 DIAGNOSIS — Z95.0 CARDIAC PACEMAKER IN SITU: ICD-10-CM

## 2024-08-15 DIAGNOSIS — E78.5 HYPERLIPIDEMIA, UNSPECIFIED HYPERLIPIDEMIA TYPE: Chronic | ICD-10-CM

## 2024-08-15 DIAGNOSIS — I25.118 CORONARY ARTERY DISEASE OF NATIVE ARTERY OF NATIVE HEART WITH STABLE ANGINA PECTORIS: ICD-10-CM

## 2024-08-15 PROCEDURE — 99999 PR PBB SHADOW E&M-EST. PATIENT-LVL IV: CPT | Mod: PBBFAC,,, | Performed by: NURSE PRACTITIONER

## 2024-08-15 PROCEDURE — 93005 ELECTROCARDIOGRAM TRACING: CPT | Mod: PBBFAC | Performed by: INTERNAL MEDICINE

## 2024-08-15 PROCEDURE — 93010 ELECTROCARDIOGRAM REPORT: CPT | Mod: S$PBB,,, | Performed by: INTERNAL MEDICINE

## 2024-08-15 PROCEDURE — 99214 OFFICE O/P EST MOD 30 MIN: CPT | Mod: PBBFAC,25 | Performed by: NURSE PRACTITIONER

## 2024-08-15 PROCEDURE — 99214 OFFICE O/P EST MOD 30 MIN: CPT | Mod: S$PBB,,, | Performed by: NURSE PRACTITIONER

## 2024-08-16 LAB
OHS QRS DURATION: 132 MS
OHS QTC CALCULATION: 473 MS

## 2024-08-16 NOTE — ASSESSMENT & PLAN NOTE
Lab Results   Component Value Date    CHOL 109 02/09/2024    CHOL 107 02/10/2023    CHOL 127 05/13/2022     Lab Results   Component Value Date    HDL 27 (L) 02/09/2024    HDL 28 (L) 02/10/2023    HDL 25 (L) 05/13/2022     Lab Results   Component Value Date    LDLCALC 43 02/09/2024    LDLCALC 41 02/10/2023    LDLCALC 39 05/13/2022     Lab Results   Component Value Date    TRIG 195 (H) 02/09/2024    TRIG 188 (H) 02/10/2023    TRIG 317 (H) 05/13/2022       Lab Results   Component Value Date    CHOLHDL 4.0 02/09/2024    CHOLHDL 3.8 02/10/2023    CHOLHDL 5.1 05/13/2022   Lipitor 10 mg po daily  Lipids followed by PCP

## 2024-08-16 NOTE — PROGRESS NOTES
PCP: Shivam Payne MD    Referring Provider:   Chief Complaint   Patient presents with    Follow-up     6m  Pt. Denies any cardiac symptoms       Subjective:   Pro Aguirre is a 69 y.o. male with hx of mild, non-obstructive CAD, HTN, HLD, COPD, DM, and  pA fib,  who presents for routine follow up. He reports that he is doing well and denies complaints.    2/15/2024 presents for routine follow up. He states he is doing well and denies complaints.     8/15/2023 presents for routine follow up. He states he is doing well and denies complaints.          Fhx:  Family History   Problem Relation Name Age of Onset    Hypertension Mother      Diabetes Mother       Shx:   Social History     Socioeconomic History    Marital status:    Tobacco Use    Smoking status: Never     Passive exposure: Never    Smokeless tobacco: Current     Types: Snuff   Substance and Sexual Activity    Alcohol use: Not Currently    Drug use: Never       EKG   8/15/2024 a paced rhythm with prolonged AV conduction; left axis deviation; RBBB; possible lateral infarct (cited on or before 8/15/2023); inferior infarct (cited on or before 7/27/2021)   2/15/2024 a paced rhythm; left axis deviation; RBBB; possible lateral infarct; inferior infarct (cite don or bfore 7/27/2021) when compared to previous EKG a paced rhythm has replaced sinus rhythm  8/15/2023 RSR with HR 81 bpm; left axis deviation; RBBB: old inferior infarct  2/13/2023 RSR with PAC's; HR 89 bpm; left axis deviation; RBBB; old inferior infarct    ECHO No results found for this or any previous visit.    [facility] 8/[facility]/15 Mild CAD with normal LVSF and size.     9/17/2015 implantation of  dual chamber Mri compatible ppm    Lab Results   Component Value Date     (L) 02/09/2024    K 4.7 02/09/2024     02/09/2024    CO2 28 02/09/2024    BUN 22 (H) 02/09/2024    CREATININE 0.84 02/09/2024    CALCIUM 9.3 02/09/2024    ANIONGAP 10 02/09/2024    EGFRNONAA 79 08/11/2021       Lab  Results   Component Value Date    CHOL 109 02/09/2024    CHOL 107 02/10/2023    CHOL 127 05/13/2022     Lab Results   Component Value Date    HDL 27 (L) 02/09/2024    HDL 28 (L) 02/10/2023    HDL 25 (L) 05/13/2022     Lab Results   Component Value Date    LDLCALC 43 02/09/2024    LDLCALC 41 02/10/2023    LDLCALC 39 05/13/2022     Lab Results   Component Value Date    TRIG 195 (H) 02/09/2024    TRIG 188 (H) 02/10/2023    TRIG 317 (H) 05/13/2022     Lab Results   Component Value Date    CHOLHDL 4.0 02/09/2024    CHOLHDL 3.8 02/10/2023    CHOLHDL 5.1 05/13/2022       Lab Results   Component Value Date    WBC 8.05 02/09/2024    HGB 12.6 (L) 02/09/2024    HCT 39.1 (L) 02/09/2024    MCV 88.1 02/09/2024     02/09/2024           Current Outpatient Medications:     aspirin (ECOTRIN) 81 MG EC tablet, Take 81 mg by mouth once daily., Disp: , Rfl:     atorvastatin (LIPITOR) 10 MG tablet, Take 1 tablet (10 mg total) by mouth every evening., Disp: 90 tablet, Rfl: 1    carvediloL (COREG) 25 MG tablet, TAKE ONE (1) TABLET BY MOUTH TWICE DAILY FOR BLOOD PRESSURE, Disp: 180 tablet, Rfl: 1    ELIQUIS 5 mg Tab, Take 1 tablet (5 mg total) by mouth 2 (two) times daily., Disp: 180 tablet, Rfl: 1    FARXIGA 10 mg tablet, Take 1 tablet (10 mg total) by mouth Daily., Disp: 90 tablet, Rfl: 0    fluticasone-umeclidin-vilanter (TRELEGY ELLIPTA) 100-62.5-25 mcg DsDv, INHALE ONE (1) PUFF BY MOUTH DAILY FOR BREATHING, Disp: 28 each, Rfl: 5    glimepiride (AMARYL) 4 MG tablet, TAKE 0.5 TABLETS (2 MILLIGRAM TOTAL) BY MOUTH 2 (TWO) TIMES DAILY., Disp: 90 tablet, Rfl: 1    JANUVIA 100 mg Tab, TAKE ONE (1) TABLET (100 MILLIGRAM TOTAL) BY MOUTH ONCE DAILY., Disp: 90 tablet, Rfl: 0    metFORMIN (GLUCOPHAGE) 1000 MG tablet, TAKE ONE (1) TABLET (1,000 MILLIGRAM TOTAL) BY MOUTH 2 (TWO) TIMES DAILY WITH MEALS., Disp: 180 tablet, Rfl: 1    multivitamin (THERAGRAN) per tablet, Take 1 tablet by mouth once daily., Disp: , Rfl:     zinc gluconate 50 mg  "tablet, Take 50 mg by mouth once daily., Disp: , Rfl:     nitroGLYCERIN (NITROSTAT) 0.4 MG SL tablet, Place 1 tablet (0.4 mg total) under the tongue every 5 (five) minutes as needed for Chest pain. (Patient not taking: Reported on 8/15/2024), Disp: 25 tablet, Rfl: 3  Meds reviewed and reconciled.      Review of Systems   Respiratory:  Negative for shortness of breath.    Cardiovascular:  Negative for chest pain, palpitations, orthopnea, claudication, leg swelling and PND.   Neurological:  Negative for dizziness, loss of consciousness and weakness.           Objective:   /70   Pulse 71   Resp 18   Ht 5' 10" (1.778 m)   Wt 82.6 kg (182 lb)   BMI 26.11 kg/m²     Physical Exam  Vitals and nursing note reviewed.   Constitutional:       Appearance: Normal appearance. He is obese.   HENT:      Head: Normocephalic and atraumatic.   Neck:      Vascular: No carotid bruit.   Cardiovascular:      Rate and Rhythm: Normal rate. Rhythm irregular.      Pulses: Normal pulses.      Heart sounds: Normal heart sounds.      Comments: Well healed ppm site to left upper chest wall  Pulmonary:      Effort: Pulmonary effort is normal.      Breath sounds: Normal breath sounds.   Abdominal:      Palpations: Abdomen is soft.   Musculoskeletal:      Cervical back: Neck supple.      Right lower leg: No edema.      Left lower leg: No edema.   Skin:     General: Skin is warm and dry.      Capillary Refill: Capillary refill takes less than 2 seconds.   Neurological:      Mental Status: He is alert.           Assessment:     1. Paroxysmal atrial fibrillation  EKG 12-lead    EKG 12-lead      2. Complete AV block        3. Coronary artery disease of native artery of native heart with stable angina pectoris        4. Hyperlipidemia, unspecified hyperlipidemia type        5. Hypertension, unspecified type        6. Cardiac pacemaker in situ              Plan:   Atrial fibrillation  Paroxysmal  CVA prophylaxis with Eliquis    Complete AV " block  S/p ppm 9/17/2015    Coronary artery disease of native artery of native heart with stable angina pectoris  Very mild CAD by [facility] 8/31/2015  Continue ASA/Lipitor    Hyperlipidemia  Lab Results   Component Value Date    CHOL 109 02/09/2024    CHOL 107 02/10/2023    CHOL 127 05/13/2022     Lab Results   Component Value Date    HDL 27 (L) 02/09/2024    HDL 28 (L) 02/10/2023    HDL 25 (L) 05/13/2022     Lab Results   Component Value Date    LDLCALC 43 02/09/2024    LDLCALC 41 02/10/2023    LDLCALC 39 05/13/2022     Lab Results   Component Value Date    TRIG 195 (H) 02/09/2024    TRIG 188 (H) 02/10/2023    TRIG 317 (H) 05/13/2022       Lab Results   Component Value Date    CHOLHDL 4.0 02/09/2024    CHOLHDL 3.8 02/10/2023    CHOLHDL 5.1 05/13/2022   Lipitor 10 mg po daily  Lipids followed by PCP      Hypertension  120/70 mmHg    Cardiac pacemaker in situ  Continue ppm interrogations as scheduled    RTC: 6 months

## 2024-10-17 DIAGNOSIS — J43.1 PANLOBULAR EMPHYSEMA: ICD-10-CM

## 2024-10-17 RX ORDER — FLUTICASONE FUROATE, UMECLIDINIUM BROMIDE AND VILANTEROL TRIFENATATE 100; 62.5; 25 UG/1; UG/1; UG/1
POWDER RESPIRATORY (INHALATION)
Qty: 1 EACH | Refills: 5 | Status: SHIPPED | OUTPATIENT
Start: 2024-10-17

## 2024-10-28 LAB
LEFT EYE DM RETINOPATHY: NEGATIVE
RIGHT EYE DM RETINOPATHY: NEGATIVE

## 2024-11-14 ENCOUNTER — HOSPITAL ENCOUNTER (OUTPATIENT)
Dept: CARDIOLOGY | Facility: HOSPITAL | Age: 69
Discharge: HOME | End: 2024-11-14
Attending: INTERNAL MEDICINE
Payer: MEDICARE

## 2024-11-14 DIAGNOSIS — Z95.0 PRESENCE OF CARDIAC PACEMAKER: Primary | ICD-10-CM

## 2024-11-14 DIAGNOSIS — Z95.0 PRESENCE OF CARDIAC PACEMAKER: ICD-10-CM

## 2024-11-14 DIAGNOSIS — E11.9 TYPE 2 DIABETES MELLITUS WITHOUT COMPLICATION, WITHOUT LONG-TERM CURRENT USE OF INSULIN: ICD-10-CM

## 2024-11-14 LAB
BATTERY VOLTAGE (V): 2.92 V
ERI (V): 2.83 V
OHS CV DC PP MS1: 0.4 MS
OHS CV DC PP MS2: 0.4 MS
OHS CV DC PP V1: 1.5 V
OHS CV DC PP V2: 2 V

## 2024-11-14 PROCEDURE — 93294 REM INTERROG EVL PM/LDLS PM: CPT | Mod: ,,, | Performed by: HOSPITALIST

## 2024-11-14 PROCEDURE — 93296 REM INTERROG EVL PM/IDS: CPT

## 2024-11-14 RX ORDER — METFORMIN HYDROCHLORIDE 1000 MG/1
TABLET ORAL
Qty: 180 TABLET | Refills: 1 | Status: SHIPPED | OUTPATIENT
Start: 2024-11-14

## 2024-11-24 ENCOUNTER — PATIENT OUTREACH (OUTPATIENT)
Facility: HOSPITAL | Age: 69
End: 2024-11-24
Payer: MEDICARE

## 2024-11-24 NOTE — PROGRESS NOTES
Population Health Chart Review & Patient Outreach Details    Health Maintenance Topics Addressed and Outreach Outcomes / Actions Taken:  Diabetic Eye Exam [x] JESICA sent to [facility].

## 2024-11-24 NOTE — LETTER
AUTHORIZATION FOR RELEASE OF   CONFIDENTIAL INFORMATION    Dear [facility],    We are seeing Pro Aguirre, date of birth 1955, in the clinic at [facility]. Shivam Payne MD is the patient's PCP. Por Aguirre has an outstanding lab/procedure at the time we reviewed his chart. In order to help keep his health information updated, he has authorized us to request the following medical record(s):        (  )  MAMMOGRAM                                      (  )  COLONOSCOPY      (  )  PAP SMEAR                                          (  )  OUTSIDE LAB RESULTS     (  )  DEXA SCAN                                          ( x )  EYE EXAM            (  )  FOOT EXAM                                          (  )  ENTIRE RECORD     (  )  OUTSIDE IMMUNIZATIONS                 (  )  _______________         Please fax records to Yeny Land LPN Care Coordinator at [phone].      If you have any questions, please contact Yeny at [phone].           Patient Name: Pro Aguirre  : 1955  Patient Phone #: [phone]                Pro Aguirre  MRN: [id number]  : 1955  Age: 69 y.o.  Sex: male         Patient/Legal Guardian Signature  This signature was collected at 08/15/2024    SELF       _______________________________   Printed Name/Relationship to Patient      Consent for Examination and Treatment: I hereby authorize the providers and employees of [facility] ([facility]) to provide medical treatment/services which includes, but is not limited to, performing and administering tests and diagnostic procedures that are deemed necessary, including, but not limited to, imaging examinations, blood tests and other laboratory procedures as may be required by the hospital, clinic, or may be ordered by my physician(s) or persons working under the general and/or special instructions of my physician(s).      I  understand and agree that this consent covers all authorized persons, including but not limited to physicians, residents, nurse practitioners, physicians' assistants, specialists, consultants, student nurses, and independently contracted physicians, who are called upon by the physician in charge, to carry out the diagnostic procedures and medical or surgical treatment.     I hereby authorize [facility] to retain or dispose of any specimens or tissue, should there be such remaining from any test or procedure.     I hereby authorize and give consent for [facility] providers and employees to take photographs, images or videotapes of such diagnostic, surgical or treatment procedures of Patient as may be required by [facility] or as may be ordered by a physician. I further acknowledge and agree that [facility] may use cameras or other devices for patient monitoring.     I am aware that the practice of medicine is not an exact science, and I acknowledge that no guarantees have been made to me as to the outcome of any tests, procedures or treatment.     Authorization for Release of Information: I understand that my insurance company and/or their agents may need information necessary to make determinations about payment/reimbursement. I hereby provide authorization to release to all insurance companies, their successors, assignees, other parties with whom they may have contracted, or others acting on their behalf, that are involved with payment for any hospital and/or clinic charges incurred by the patient, any information that they request and deem necessary for payment/reimbursement, and/or quality review.  I further authorize the release of my health information to physicians or other health care practitioners on staff who are involved in my health care now and in the future, and to other health care providers, entities, or institutions for the purpose of my continued care and treatment, including referrals.     REGISTRATION  AUTHORIZATION  Form No. 89439 (Rev. 3/25/2024)    Page 1 of 3                       Medicare Patient's Certification and Authorization to Release Information and Payment Request:  I certify that the information given by me in applying for payment under Title XVIII of the Social Security Act is correct. I authorize any christianson of medical or other information about me to release to the Social SecurityAdministration, or its intermediaries or carriers, any information needed for this or a related Medicare claim. I request that payment of authorized benefits be made on my behalf.     Assignment of Insurance Benefits:   I hereby authorize any and all insurance companies, health plans, defined   benefit plans, health insurers or any entity that is or may be responsible for payment of my medical expenses to pay all hospital and medical benefits now due, and to become due and payable to me under any hospital benefits, sick benefits, injury benefits or any other benefit for services rendered to me, including Major Medical Benefits, direct to [facility] and all independently contracted physicians. I assign any and all rights that I may have against any and all insurance companies, health plans, defined benefit plans, health insurers or any entity that is or may be responsible for payment of my medical expenses, including, but not limited to any right to appeal a denial of a claim, any right to bring any action, lawsuit, administrative proceeding, or other cause of action on my behalf. I specifically assign my right to pursue litigation against any and all insurance companies, health plans, defined benefit plans, health insurers or any entity that is or may be responsible for payment of my medical expenses based upon a refusal to pay charges.            E. Valuables: It is understood and agreed that [facility] is not liable for the damage to or loss of any money, jewelry,   documents, dentures, eye glasses, hearing aids, prosthetics,  or other property of value.     F. Computer Equipment: I understand and agree that should I choose to use computer equipment owned by [facility] or if I choose to access the Internet via [facility]s network, I do so at my own risk. [facility] is not responsible for any damage to my computer equipment or to any damages of any type that might arise from my loss of equipment or data.     G. Acceptance of Financial Responsibility:  I agree that in consideration of the services and   supplies that have been   or will be furnished to the patient, I am hereby obligated to pay all charges made for or on the account of the patient according to the standard rates (in effect at the time the services and supplies are delivered) established by [facility], including its Patient Financial Assistance Policy to the extent it is applicable. I understand that I am responsible for all charges, or portions thereof, not covered by insurance or other sources. Patient refunds will be distributed only after balances at all [facility] facilities are paid.     H. Communication Authorization:  I hereby authorize [facility] and its representatives, along with any billing service   or  who may work on their behalf, to contact me on   my cell phone and/or home phone using pre- recorded messages, artificial voice messages, automatic telephone dialing devices or other computer assisted technology, or by electronic      mail, text messaging, or by any other form of electronic communication. This includes, but is not limited to, appointment reminders, yearly physical exam reminders, preventive care reminders, patient campaigns, welcome calls, and calls about account balances on my account or any account on which I am listed as a guarantor. I understand I have the right to opt out of these communications at any time.      Relationship  Between  Facility and  Provider:      I understand that some, but not all, providers furnishing services to the  patient are not employees or agents of [facility]. The patient is under the care and supervision of his/her attending physician, and it is the responsibility of the facility and its nursing staff to carry out the instructions of such physicians. It is the responsibility of the patient's physician/designee to obtain the patient's informed consent, when required, for medical or surgical treatment, special diagnostic or therapeutic procedures, or hospital services rendered for the patient under the special instructions of the physician/designee.           REGISTRATION AUTHORIZATION  Form No. 73921 (Rev. 3/25/2024)    Page 2 of 3                       Immunizations: [facility] shares immunization information with state sponsored health departments to help you and your doctor keep track of your immunization records. By signing, you consent to have this information shared with the health department in your state:                                [place] - LINKS ([place] Immunization Network for Kids Statewide)                                [place] - MIIX ([place] Immunization Information eXchange)                                [place] - ImmPRINT (Immunization Patient Registry with Integrated Technology)     TERM: This authorization is valid for this and subsequent care/treatment I receive at [facility] and will remain valid unless/until revoked in writing by me.     [facility]: As used in this document, [facility] means all [facility] owned and managed facilities, including, but not limited to, all health centers, surgery centers, clinics, urgent care centers, and hospitals.         [facility] complies with applicable Federal civil rights laws and does not discriminate on the basis of race, color, national origin, age, disability, or sex.  ATENCIÓN: si eusebiala vidal, tiene a polk disposición servicios gratuitos de asistencia lingüística. Jonathon krishnamurthy [phone].  [facility] Ý: N?u b?n nói Ti?ng Vi?t, có  các d?ch v? h? tr? jarrettôn ng? mi?n phí julissah cho b?n. G?i s? [phone].        REGISTRATION AUTHORIZATION  Form No. 90663 (Rev. 3/25/2024)   Page 3 of 3     Patient

## 2024-11-29 DIAGNOSIS — E11.9 TYPE 2 DIABETES MELLITUS WITHOUT COMPLICATION, WITHOUT LONG-TERM CURRENT USE OF INSULIN: ICD-10-CM

## 2024-12-02 RX ORDER — SITAGLIPTIN 100 MG/1
100 TABLET, FILM COATED ORAL DAILY
Qty: 90 TABLET | Refills: 0 | Status: SHIPPED | OUTPATIENT
Start: 2024-12-02

## 2024-12-12 ENCOUNTER — PATIENT OUTREACH (OUTPATIENT)
Facility: HOSPITAL | Age: 69
End: 2024-12-12
Payer: MEDICARE

## 2024-12-12 NOTE — PROGRESS NOTES
Population Health Chart Review & Patient Outreach Details    Health Maintenance Topics Addressed and Outreach Outcomes / Actions Taken:  Diabetic Eye Exam [x] HM Updated with October 2024 Eye Exam (Dr. Asencio). History Updated.

## 2024-12-17 DIAGNOSIS — E11.9 TYPE 2 DIABETES MELLITUS WITHOUT COMPLICATION, WITHOUT LONG-TERM CURRENT USE OF INSULIN: ICD-10-CM

## 2024-12-17 RX ORDER — DAPAGLIFLOZIN 10 MG/1
TABLET, FILM COATED ORAL
Qty: 90 TABLET | Refills: 0 | Status: SHIPPED | OUTPATIENT
Start: 2024-12-17

## 2025-01-11 DIAGNOSIS — E11.9 TYPE 2 DIABETES MELLITUS WITHOUT COMPLICATION, WITHOUT LONG-TERM CURRENT USE OF INSULIN: ICD-10-CM

## 2025-01-13 RX ORDER — DAPAGLIFLOZIN 10 MG/1
10 TABLET, FILM COATED ORAL DAILY
Qty: 90 TABLET | Refills: 0 | Status: SHIPPED | OUTPATIENT
Start: 2025-01-13

## 2025-02-10 ENCOUNTER — OFFICE VISIT (OUTPATIENT)
Dept: FAMILY MEDICINE | Facility: CLINIC | Age: 70
End: 2025-02-10
Payer: MEDICARE

## 2025-02-10 VITALS
BODY MASS INDEX: 26.34 KG/M2 | WEIGHT: 184 LBS | HEIGHT: 70 IN | OXYGEN SATURATION: 100 % | HEART RATE: 98 BPM | TEMPERATURE: 98 F | SYSTOLIC BLOOD PRESSURE: 170 MMHG | RESPIRATION RATE: 16 BRPM | DIASTOLIC BLOOD PRESSURE: 82 MMHG

## 2025-02-10 DIAGNOSIS — Z12.5 SCREENING PSA (PROSTATE SPECIFIC ANTIGEN): ICD-10-CM

## 2025-02-10 DIAGNOSIS — J43.1 PANLOBULAR EMPHYSEMA: Chronic | ICD-10-CM

## 2025-02-10 DIAGNOSIS — I25.118 CORONARY ARTERY DISEASE OF NATIVE ARTERY OF NATIVE HEART WITH STABLE ANGINA PECTORIS: ICD-10-CM

## 2025-02-10 DIAGNOSIS — E11.9 TYPE 2 DIABETES MELLITUS WITHOUT COMPLICATION, WITHOUT LONG-TERM CURRENT USE OF INSULIN: Primary | ICD-10-CM

## 2025-02-10 DIAGNOSIS — I10 HYPERTENSION, UNSPECIFIED TYPE: ICD-10-CM

## 2025-02-10 DIAGNOSIS — E78.5 HYPERLIPIDEMIA, UNSPECIFIED HYPERLIPIDEMIA TYPE: ICD-10-CM

## 2025-02-10 DIAGNOSIS — Z23 ENCOUNTER FOR IMMUNIZATION: ICD-10-CM

## 2025-02-10 DIAGNOSIS — I48.0 PAROXYSMAL ATRIAL FIBRILLATION: Chronic | ICD-10-CM

## 2025-02-10 LAB
ALBUMIN SERPL BCP-MCNC: 4.1 G/DL (ref 3.4–4.8)
ALP SERPL-CCNC: 55 U/L (ref 40–150)
ALT SERPL W P-5'-P-CCNC: 26 U/L
ANION GAP SERPL CALCULATED.3IONS-SCNC: 17 MMOL/L (ref 7–16)
AST SERPL W P-5'-P-CCNC: 29 U/L (ref 5–34)
BASOPHILS # BLD AUTO: 0.06 K/UL (ref 0–0.2)
BASOPHILS NFR BLD AUTO: 0.8 % (ref 0–1)
BILIRUB DIRECT SERPL-MCNC: 0.2 MG/DL
BILIRUB SERPL-MCNC: 0.4 MG/DL
BUN SERPL-MCNC: 32 MG/DL (ref 8–26)
BUN/CREAT SERPL: 26 (ref 6–20)
CALCIUM SERPL-MCNC: 10 MG/DL (ref 8.8–10)
CHLORIDE SERPL-SCNC: 97 MMOL/L (ref 98–107)
CHOLEST SERPL-MCNC: 134 MG/DL
CHOLEST/HDLC SERPL: 4.8 {RATIO}
CO2 SERPL-SCNC: 25 MMOL/L (ref 23–31)
CREAT SERPL-MCNC: 1.23 MG/DL (ref 0.72–1.25)
DIFFERENTIAL METHOD BLD: ABNORMAL
EGFR (NO RACE VARIABLE) (RUSH/TITUS): 63 ML/MIN/1.73M2
EOSINOPHIL # BLD AUTO: 0.17 K/UL (ref 0–0.5)
EOSINOPHIL NFR BLD AUTO: 2.2 % (ref 1–4)
ERYTHROCYTE [DISTWIDTH] IN BLOOD BY AUTOMATED COUNT: 15 % (ref 11.5–14.5)
EST. AVERAGE GLUCOSE BLD GHB EST-MCNC: 177 MG/DL
GLUCOSE SERPL-MCNC: 152 MG/DL (ref 82–115)
HBA1C MFR BLD HPLC: 7.8 %
HCT VFR BLD AUTO: 43.5 % (ref 40–54)
HDLC SERPL-MCNC: 28 MG/DL (ref 35–60)
HGB BLD-MCNC: 13 G/DL (ref 13.5–18)
IMM GRANULOCYTES # BLD AUTO: 0.14 K/UL (ref 0–0.04)
IMM GRANULOCYTES NFR BLD: 1.8 % (ref 0–0.4)
LDLC SERPL CALC-MCNC: 58 MG/DL
LDLC/HDLC SERPL: 2.1 {RATIO}
LYMPHOCYTES # BLD AUTO: 1.17 K/UL (ref 1–4.8)
LYMPHOCYTES NFR BLD AUTO: 15.2 % (ref 27–41)
MCH RBC QN AUTO: 28.1 PG (ref 27–31)
MCHC RBC AUTO-ENTMCNC: 29.9 G/DL (ref 32–36)
MCV RBC AUTO: 94 FL (ref 80–96)
MONOCYTES # BLD AUTO: 0.64 K/UL (ref 0–0.8)
MONOCYTES NFR BLD AUTO: 8.3 % (ref 2–6)
MPC BLD CALC-MCNC: 11.6 FL (ref 9.4–12.4)
NEUTROPHILS # BLD AUTO: 5.54 K/UL (ref 1.8–7.7)
NEUTROPHILS NFR BLD AUTO: 71.7 % (ref 53–65)
NONHDLC SERPL-MCNC: 106 MG/DL
NRBC # BLD AUTO: 0 X10E3/UL
NRBC, AUTO (.00): 0 %
PLATELET # BLD AUTO: 222 K/UL (ref 150–400)
POTASSIUM SERPL-SCNC: 4.8 MMOL/L (ref 3.5–5.1)
PROT SERPL-MCNC: 7.6 G/DL (ref 5.8–7.6)
PSA SERPL-MCNC: 0.2 NG/ML
RBC # BLD AUTO: 4.63 M/UL (ref 4.6–6.2)
SODIUM SERPL-SCNC: 134 MMOL/L (ref 136–145)
TRIGL SERPL-MCNC: 238 MG/DL (ref 34–140)
VLDLC SERPL-MCNC: 48 MG/DL
WBC # BLD AUTO: 7.72 K/UL (ref 4.5–11)

## 2025-02-10 PROCEDURE — G0103 PSA SCREENING: HCPCS | Mod: ,,, | Performed by: CLINICAL MEDICAL LABORATORY

## 2025-02-10 PROCEDURE — 80061 LIPID PANEL: CPT | Mod: ,,, | Performed by: CLINICAL MEDICAL LABORATORY

## 2025-02-10 PROCEDURE — 99214 OFFICE O/P EST MOD 30 MIN: CPT | Mod: ,,, | Performed by: INTERNAL MEDICINE

## 2025-02-10 PROCEDURE — 90677 PCV20 VACCINE IM: CPT | Mod: ,,, | Performed by: INTERNAL MEDICINE

## 2025-02-10 PROCEDURE — 85025 COMPLETE CBC W/AUTO DIFF WBC: CPT | Mod: ,,, | Performed by: CLINICAL MEDICAL LABORATORY

## 2025-02-10 PROCEDURE — 80076 HEPATIC FUNCTION PANEL: CPT | Mod: ,,, | Performed by: CLINICAL MEDICAL LABORATORY

## 2025-02-10 PROCEDURE — G0009 ADMIN PNEUMOCOCCAL VACCINE: HCPCS | Mod: ,,, | Performed by: INTERNAL MEDICINE

## 2025-02-10 PROCEDURE — 80048 BASIC METABOLIC PNL TOTAL CA: CPT | Mod: ,,, | Performed by: CLINICAL MEDICAL LABORATORY

## 2025-02-10 PROCEDURE — 83036 HEMOGLOBIN GLYCOSYLATED A1C: CPT | Mod: ,,, | Performed by: CLINICAL MEDICAL LABORATORY

## 2025-02-10 RX ORDER — SITAGLIPTIN 100 MG/1
100 TABLET, FILM COATED ORAL DAILY
Qty: 90 TABLET | Refills: 1 | Status: SHIPPED | OUTPATIENT
Start: 2025-02-10

## 2025-02-10 RX ORDER — GLIMEPIRIDE 4 MG/1
TABLET ORAL
Qty: 90 TABLET | Refills: 1 | Status: SHIPPED | OUTPATIENT
Start: 2025-02-10

## 2025-02-10 RX ORDER — DAPAGLIFLOZIN 10 MG/1
10 TABLET, FILM COATED ORAL DAILY
Qty: 90 TABLET | Refills: 1 | Status: SHIPPED | OUTPATIENT
Start: 2025-02-10

## 2025-02-10 RX ORDER — ATORVASTATIN CALCIUM 10 MG/1
10 TABLET, FILM COATED ORAL NIGHTLY
Qty: 90 TABLET | Refills: 1 | Status: SHIPPED | OUTPATIENT
Start: 2025-02-10

## 2025-02-10 RX ORDER — CARVEDILOL 25 MG/1
TABLET ORAL
Qty: 180 TABLET | Refills: 1 | Status: SHIPPED | OUTPATIENT
Start: 2025-02-10

## 2025-02-10 NOTE — PROGRESS NOTES
New Clinic Note    Patient Name:  Pro Aguirre is a 69 y.o. male     Chief Complaint:    Chief Complaint   Patient presents with    Diabetes     Patient is here for his checkup today.     Hyperlipidemia    Hypertension    Health Maintenance     Refuses colonoscopy right now.         Subjective  Diabetes  Pertinent negatives for hypoglycemia include no dizziness, headaches or nervousness/anxiousness. Pertinent negatives for diabetes include no chest pain and no fatigue.   Hyperlipidemia  Pertinent negatives include no chest pain or shortness of breath.   Hypertension  Pertinent negatives include no chest pain, headaches, neck pain, palpitations or shortness of breath.            Current Outpatient Medications:     aspirin (ECOTRIN) 81 MG EC tablet, Take 81 mg by mouth once daily., Disp: , Rfl:     ELIQUIS 5 mg Tab, Take 1 tablet (5 mg total) by mouth 2 (two) times daily., Disp: 180 tablet, Rfl: 1    fluticasone-umeclidin-vilanter (TRELEGY ELLIPTA) 100-62.5-25 mcg DsDv, INHALE ONE (1) PUFF BY MOUTH DAILY FOR BREATHING, Disp: 1 each, Rfl: 5    metFORMIN (GLUCOPHAGE) 1000 MG tablet, TAKE ONE (1) TABLET (1,000 MILLIGRAM TOTAL) BY MOUTH 2 (TWO) TIMES DAILY WITH MEALS., Disp: 180 tablet, Rfl: 1    multivitamin (THERAGRAN) per tablet, Take 1 tablet by mouth once daily., Disp: , Rfl:     zinc gluconate 50 mg tablet, Take 50 mg by mouth once daily., Disp: , Rfl:     atorvastatin (LIPITOR) 10 MG tablet, Take 1 tablet (10 mg total) by mouth every evening., Disp: 90 tablet, Rfl: 1    carvediloL (COREG) 25 MG tablet, TAKE ONE (1) TABLET BY MOUTH TWICE DAILY FOR BLOOD PRESSURE, Disp: 180 tablet, Rfl: 1    FARXIGA 10 mg tablet, Take 1 tablet (10 mg total) by mouth once daily., Disp: 90 tablet, Rfl: 1    glimepiride (AMARYL) 4 MG tablet, TAKE 0.5 TABLETS (2 MILLIGRAM TOTAL) BY MOUTH 2 (TWO) TIMES DAILY., Disp: 90 tablet, Rfl: 1    JANUVIA 100 mg Tab, Take 1 tablet (100 mg total) by mouth once daily., Disp: 90 tablet,  Rfl: 1    nitroGLYCERIN (NITROSTAT) 0.4 MG SL tablet, Place 1 tablet (0.4 mg total) under the tongue every 5 (five) minutes as needed for Chest pain., Disp: 25 tablet, Rfl: 3  No current facility-administered medications for this visit.   Past Medical History:   Diagnosis Date    ARF (acute renal failure)     Atrial fibrillation     Carotid artery occlusion     normal dopplers 8/20    COPD (chronic obstructive pulmonary disease)     Coronary artery disease     Diabetes mellitus, type 2     Diabetic eye exam 10/09/2023    Dr. [facility]    Diabetic eye exam 10/28/2024    Dr. Bobby [facility]    GERD (gastroesophageal reflux disease)     H/O iron deficiency anemia     History of pericarditis     Hyperlipidemia     Hypertension     GARCÍA (nonalcoholic steatohepatitis)       Past Surgical History:   Procedure Laterality Date    APPENDECTOMY      TONSILLECTOMY        Family History   Problem Relation Name Age of Onset    Hypertension Mother      Diabetes Mother        Social History     Tobacco Use    Smoking status: Never     Passive exposure: Never    Smokeless tobacco: Current     Types: Snuff   Substance Use Topics    Alcohol use: Not Currently    Drug use: Never        Review of Systems   Constitutional:  Negative for fatigue and fever.   HENT:  Negative for nasal congestion and sore throat.    Respiratory:  Negative for cough, shortness of breath and wheezing.    Cardiovascular:  Negative for chest pain and palpitations.   Gastrointestinal:  Negative for abdominal pain and blood in stool.   Genitourinary:  Negative for dysuria.   Musculoskeletal:  Negative for back pain and neck pain.   Integumentary:  Negative for rash and mole/lesion.   Neurological:  Negative for dizziness, headaches and memory loss.   Psychiatric/Behavioral:  Negative for agitation. The patient is not nervous/anxious.         Objective:  BP (!) 170/82 (BP Location: Left arm, Patient Position:  "Sitting)   Pulse 98   Temp 98 °F (36.7 °C) (Oral)   Resp 16   Ht 5' 10" (1.778 m)   Wt 83.5 kg (184 lb)   SpO2 100%   BMI 26.40 kg/m²      Physical Exam  Constitutional:       Appearance: Normal appearance.   HENT:      Head: Normocephalic and atraumatic.      Right Ear: External ear normal.      Left Ear: External ear normal.      Nose: Nose normal.   Eyes:      Extraocular Movements: Extraocular movements intact.      Conjunctiva/sclera: Conjunctivae normal.      Pupils: Pupils are equal, round, and reactive to light.   Cardiovascular:      Rate and Rhythm: Normal rate and regular rhythm.      Pulses: Normal pulses.      Heart sounds: Normal heart sounds. No murmur heard.     No friction rub. No gallop.   Pulmonary:      Effort: Pulmonary effort is normal.      Breath sounds: No wheezing, rhonchi or rales.   Abdominal:      General: Abdomen is flat.      Palpations: Abdomen is soft.   Musculoskeletal:      Cervical back: Normal range of motion and neck supple.      Right lower leg: No edema.      Left lower leg: No edema.   Skin:     General: Skin is warm and dry.      Findings: No rash.   Neurological:      General: No focal deficit present.      Mental Status: He is alert and oriented to person, place, and time.      Cranial Nerves: No cranial nerve deficit.   Psychiatric:         Mood and Affect: Mood normal.          Assessment and Plan    Type 2 diabetes mellitus without complication, without long-term current use of insulin  -     glimepiride (AMARYL) 4 MG tablet; TAKE 0.5 TABLETS (2 MILLIGRAM TOTAL) BY MOUTH 2 (TWO) TIMES DAILY.  Dispense: 90 tablet; Refill: 1  -     FARXIGA 10 mg tablet; Take 1 tablet (10 mg total) by mouth once daily.  Dispense: 90 tablet; Refill: 1  -     JANUVIA 100 mg Tab; Take 1 tablet (100 mg total) by mouth once daily.  Dispense: 90 tablet; Refill: 1  -     Hemoglobin A1C; Future; Expected date: 02/10/2025    Hypertension, unspecified type  -     carvediloL (COREG) 25 MG " tablet; TAKE ONE (1) TABLET BY MOUTH TWICE DAILY FOR BLOOD PRESSURE  Dispense: 180 tablet; Refill: 1  -     Basic Metabolic Panel; Future; Expected date: 02/10/2025  -     CBC Auto Differential; Future; Expected date: 02/10/2025    Hyperlipidemia, unspecified hyperlipidemia type  -     atorvastatin (LIPITOR) 10 MG tablet; Take 1 tablet (10 mg total) by mouth every evening.  Dispense: 90 tablet; Refill: 1  -     Hepatic Function Panel; Future; Expected date: 02/10/2025  -     Lipid Panel; Future; Expected date: 02/10/2025    Screening PSA (prostate specific antigen)  -     PSA, Screening; Future; Expected date: 02/10/2025    Paroxysmal atrial fibrillation    Coronary artery disease of native artery of native heart with stable angina pectoris    Panlobular emphysema    Encounter for immunization  -     pneumoc 20-casandra conj-dip cr(PF) (PREVNAR-20 (PF)) injection Syrg 0.5 mL         Problem List Items Addressed This Visit       Hypertension (Chronic)    Relevant Medications    carvediloL (COREG) 25 MG tablet    Other Relevant Orders    Basic Metabolic Panel    CBC Auto Differential    Hyperlipidemia (Chronic)    Relevant Medications    atorvastatin (LIPITOR) 10 MG tablet    Other Relevant Orders    Hepatic Function Panel    Lipid Panel    Coronary artery disease of native artery of native heart with stable angina pectoris    Overview     Very mild CAD by [facility] [place] 8/31/2015         Atrial fibrillation (Chronic)    Overview     Paroxysmal; cva prophylaxis with Eliquis         COPD (chronic obstructive pulmonary disease) (Chronic)    Diabetes mellitus, type 2 - Primary (Chronic)    Relevant Medications    glimepiride (AMARYL) 4 MG tablet    FARXIGA 10 mg tablet    JANUVIA 100 mg Tab    Other Relevant Orders    Hemoglobin A1C     Other Visit Diagnoses       Screening PSA (prostate specific antigen)        Relevant Orders    PSA, Screening    Encounter for immunization        Relevant Medications    pneumoc 20-casandra conj-dip cr(PF)  (PREVNAR-20 (PF)) injection Syrg 0.5 mL (Completed)         1-DMII-A1c  4-CWW-rdqifcj good, will check at home before adjusting med  3-CAD-stable, on ASA  9-Ahiriuqimqpdsm-imrx statin check labs  5-Prevnar 20  6-PSA screen  7-A-fib controlled, on eliquis  8-COPD stable      Follow up in about 6 months (around 8/10/2025).

## 2025-02-11 NOTE — PROGRESS NOTES
Looks like he needs to drink more.  Increase fluid intake.  A1c is up to 7.8-watch diet and report back FSGs.

## 2025-02-14 DIAGNOSIS — I25.10 CORONARY ARTERY DISEASE INVOLVING NATIVE CORONARY ARTERY OF NATIVE HEART WITHOUT ANGINA PECTORIS: ICD-10-CM

## 2025-02-14 DIAGNOSIS — E11.9 TYPE 2 DIABETES MELLITUS WITHOUT COMPLICATION, WITHOUT LONG-TERM CURRENT USE OF INSULIN: ICD-10-CM

## 2025-02-14 DIAGNOSIS — E78.5 HYPERLIPIDEMIA, UNSPECIFIED HYPERLIPIDEMIA TYPE: ICD-10-CM

## 2025-02-14 RX ORDER — APIXABAN 5 MG/1
5 TABLET, FILM COATED ORAL 2 TIMES DAILY
Qty: 180 TABLET | Refills: 1 | Status: SHIPPED | OUTPATIENT
Start: 2025-02-14

## 2025-02-14 RX ORDER — GLIMEPIRIDE 4 MG/1
TABLET ORAL
Qty: 90 TABLET | Refills: 1 | OUTPATIENT
Start: 2025-02-14

## 2025-02-14 RX ORDER — ATORVASTATIN CALCIUM 10 MG/1
TABLET, FILM COATED ORAL
Qty: 90 TABLET | Refills: 1 | OUTPATIENT
Start: 2025-02-14

## 2025-02-14 RX ORDER — APIXABAN 5 MG/1
TABLET, FILM COATED ORAL
Qty: 180 TABLET | Refills: 1 | OUTPATIENT
Start: 2025-02-14

## 2025-02-17 ENCOUNTER — OFFICE VISIT (OUTPATIENT)
Dept: CARDIOLOGY | Facility: CLINIC | Age: 70
End: 2025-02-17
Payer: MEDICARE

## 2025-02-17 ENCOUNTER — HOSPITAL ENCOUNTER (OUTPATIENT)
Dept: CARDIOLOGY | Facility: HOSPITAL | Age: 70
Discharge: HOME | End: 2025-02-17
Attending: INTERNAL MEDICINE
Payer: MEDICARE

## 2025-02-17 VITALS
HEART RATE: 79 BPM | WEIGHT: 185.19 LBS | BODY MASS INDEX: 26.51 KG/M2 | HEIGHT: 70 IN | OXYGEN SATURATION: 99 % | DIASTOLIC BLOOD PRESSURE: 70 MMHG | SYSTOLIC BLOOD PRESSURE: 150 MMHG

## 2025-02-17 DIAGNOSIS — E78.5 HYPERLIPIDEMIA, UNSPECIFIED HYPERLIPIDEMIA TYPE: Chronic | ICD-10-CM

## 2025-02-17 DIAGNOSIS — Z95.0 PRESENCE OF CARDIAC PACEMAKER: ICD-10-CM

## 2025-02-17 DIAGNOSIS — I48.91 ATRIAL FIBRILLATION, UNSPECIFIED TYPE: Primary | ICD-10-CM

## 2025-02-17 DIAGNOSIS — I25.118 CORONARY ARTERY DISEASE OF NATIVE ARTERY OF NATIVE HEART WITH STABLE ANGINA PECTORIS: ICD-10-CM

## 2025-02-17 DIAGNOSIS — I44.2 COMPLETE AV BLOCK: Chronic | ICD-10-CM

## 2025-02-17 DIAGNOSIS — Z95.0 CARDIAC PACEMAKER IN SITU: ICD-10-CM

## 2025-02-17 PROCEDURE — 99214 OFFICE O/P EST MOD 30 MIN: CPT | Mod: PBBFAC,25 | Performed by: NURSE PRACTITIONER

## 2025-02-17 PROCEDURE — 93280 PM DEVICE PROGR EVAL DUAL: CPT | Mod: 26,,, | Performed by: INTERNAL MEDICINE

## 2025-02-17 PROCEDURE — 93005 ELECTROCARDIOGRAM TRACING: CPT | Mod: PBBFAC | Performed by: INTERNAL MEDICINE

## 2025-02-17 PROCEDURE — 93280 PM DEVICE PROGR EVAL DUAL: CPT

## 2025-02-17 NOTE — ASSESSMENT & PLAN NOTE
Lab Results   Component Value Date    CHOL 134 02/10/2025    CHOL 109 02/09/2024    CHOL 107 02/10/2023     Lab Results   Component Value Date    HDL 28 (L) 02/10/2025    HDL 27 (L) 02/09/2024    HDL 28 (L) 02/10/2023     Lab Results   Component Value Date    LDLCALC 58 02/10/2025    LDLCALC 43 02/09/2024    LDLCALC 41 02/10/2023     Lab Results   Component Value Date    TRIG 238 (H) 02/10/2025    TRIG 195 (H) 02/09/2024    TRIG 188 (H) 02/10/2023       Lab Results   Component Value Date    CHOLHDL 4.8 02/10/2025    CHOLHDL 4.0 02/09/2024    CHOLHDL 3.8 02/10/2023     Lipitor 10 mg po daily  Lipids followed by PCP

## 2025-02-17 NOTE — PROGRESS NOTES
PCP: Shivam Payne MD    Referring Provider:   Chief Complaint   Patient presents with    Atrial Fibrillation    Follow-up     Patient denies any cardiac complaints today.       Subjective:   Pro Aguirre is a 69 y.o. male with hx of mild, non-obstructive CAD, HTN, HLD, COPD, DM, and  pA fib,  who presents for routine follow up. He remains physically active and asymptomatic.     8/15/2024 presents for routine follow up. He reports that he is doing well and denies complaints.    2/15/2024 presents for routine follow up. He states he is doing well and denies complaints.     8/15/2023 presents for routine follow up. He states he is doing well and denies complaints.          Fhx:  Family History   Problem Relation Name Age of Onset    Hypertension Mother      Diabetes Mother       Shx:   Social History     Socioeconomic History    Marital status:    Tobacco Use    Smoking status: Never     Passive exposure: Never    Smokeless tobacco: Current     Types: Snuff   Substance and Sexual Activity    Alcohol use: Not Currently    Drug use: Never       EKG   2/17/2025 a paced rhythm; HR 74 bpm; left axis deviation; RBBB; inferior infarct (cited on or before 2/13/2023)  8/15/2024 a paced rhythm with prolonged AV conduction; left axis deviation; RBBB; possible lateral infarct (cited on or before 8/15/2023); inferior infarct (cited on or before 7/27/2021)   2/15/2024 a paced rhythm; left axis deviation; RBBB; possible lateral infarct; inferior infarct (cite don or bfore 7/27/2021) when compared to previous EKG a paced rhythm has replaced sinus rhythm  8/15/2023 RSR with HR 81 bpm; left axis deviation; RBBB: old inferior infarct  2/13/2023 RSR with PAC's; HR 89 bpm; left axis deviation; RBBB; old inferior infarct    ECHO No results found for this or any previous visit.    [facility] 8/31/15 Mild CAD with normal LVSF and size.     9/17/2015 implantation of  dual chamber Mri compatible ppm    Lab Results   Component Value  Date     (L) 02/10/2025    K 4.8 02/10/2025    CL 97 (L) 02/10/2025    CO2 25 02/10/2025    BUN 32 (H) 02/10/2025    CREATININE 1.23 02/10/2025    CALCIUM 10.0 02/10/2025    ANIONGAP 17 (H) 02/10/2025    EGFRNONAA 79 08/11/2021       Lab Results   Component Value Date    CHOL 134 02/10/2025    CHOL 109 02/09/2024    CHOL 107 02/10/2023     Lab Results   Component Value Date    HDL 28 (L) 02/10/2025    HDL 27 (L) 02/09/2024    HDL 28 (L) 02/10/2023     Lab Results   Component Value Date    LDLCALC 58 02/10/2025    LDLCALC 43 02/09/2024    LDLCALC 41 02/10/2023     Lab Results   Component Value Date    TRIG 238 (H) 02/10/2025    TRIG 195 (H) 02/09/2024    TRIG 188 (H) 02/10/2023     Lab Results   Component Value Date    CHOLHDL 4.8 02/10/2025    CHOLHDL 4.0 02/09/2024    CHOLHDL 3.8 02/10/2023       Lab Results   Component Value Date    WBC 7.72 02/10/2025    HGB 13.0 (L) 02/10/2025    HCT 43.5 02/10/2025    MCV 94.0 02/10/2025     02/10/2025           Current Outpatient Medications:     aspirin (ECOTRIN) 81 MG EC tablet, Take 81 mg by mouth once daily., Disp: , Rfl:     atorvastatin (LIPITOR) 10 MG tablet, Take 1 tablet (10 mg total) by mouth every evening., Disp: 90 tablet, Rfl: 1    carvediloL (COREG) 25 MG tablet, TAKE ONE (1) TABLET BY MOUTH TWICE DAILY FOR BLOOD PRESSURE, Disp: 180 tablet, Rfl: 1    ELIQUIS 5 mg Tab, Take 1 tablet (5 mg total) by mouth 2 (two) times daily., Disp: 180 tablet, Rfl: 1    FARXIGA 10 mg tablet, Take 1 tablet (10 mg total) by mouth once daily., Disp: 90 tablet, Rfl: 1    fluticasone-umeclidin-vilanter (TRELEGY ELLIPTA) 100-62.5-25 mcg DsDv, INHALE ONE (1) PUFF BY MOUTH DAILY FOR BREATHING, Disp: 1 each, Rfl: 5    glimepiride (AMARYL) 4 MG tablet, TAKE 0.5 TABLETS (2 MILLIGRAM TOTAL) BY MOUTH 2 (TWO) TIMES DAILY., Disp: 90 tablet, Rfl: 1    JANUVIA 100 mg Tab, Take 1 tablet (100 mg total) by mouth once daily., Disp: 90 tablet, Rfl: 1    metFORMIN (GLUCOPHAGE) 1000 MG  "tablet, TAKE ONE (1) TABLET (1,000 MILLIGRAM TOTAL) BY MOUTH 2 (TWO) TIMES DAILY WITH MEALS., Disp: 180 tablet, Rfl: 1    multivitamin (THERAGRAN) per tablet, Take 1 tablet by mouth once daily., Disp: , Rfl:     nitroGLYCERIN (NITROSTAT) 0.4 MG SL tablet, Place 1 tablet (0.4 mg total) under the tongue every 5 (five) minutes as needed for Chest pain., Disp: 25 tablet, Rfl: 3    zinc gluconate 50 mg tablet, Take 50 mg by mouth once daily., Disp: , Rfl:   Meds reviewed and reconciled.      Review of Systems   Respiratory:  Negative for shortness of breath.    Cardiovascular:  Negative for chest pain, palpitations, orthopnea, claudication, leg swelling and PND.   Neurological:  Negative for dizziness, loss of consciousness and weakness.           Objective:   BP (!) 150/70 (BP Location: Left arm, Patient Position: Sitting)   Pulse 79   Ht 5' 10" (1.778 m)   Wt 84 kg (185 lb 3.2 oz)   SpO2 99%   BMI 26.57 kg/m²     Physical Exam  Vitals and nursing note reviewed.   Constitutional:       Appearance: Normal appearance. He is obese.   HENT:      Head: Normocephalic and atraumatic.   Neck:      Vascular: No carotid bruit.   Cardiovascular:      Rate and Rhythm: Normal rate. Rhythm irregular.      Pulses: Normal pulses.      Heart sounds: Normal heart sounds.      Comments: Well healed ppm site to left upper chest wall  Pulmonary:      Effort: Pulmonary effort is normal.      Breath sounds: Normal breath sounds.   Abdominal:      Palpations: Abdomen is soft.   Musculoskeletal:      Cervical back: Neck supple.      Right lower leg: No edema.      Left lower leg: No edema.   Skin:     General: Skin is warm and dry.      Capillary Refill: Capillary refill takes less than 2 seconds.   Neurological:      Mental Status: He is alert.           Assessment:     1. Atrial fibrillation, unspecified type  EKG 12-lead      2. Complete AV block        3. Coronary artery disease of native artery of native heart with stable angina " pectoris        4. Hyperlipidemia, unspecified hyperlipidemia type        5. Cardiac pacemaker in situ              Plan:   Atrial fibrillation  Paroxysmal  Continue CVA prophylaxis with Eliquis    Complete AV block  S/p ppm 9/17/2015    Coronary artery disease of native artery of native heart with stable angina pectoris  Very mild CAD by [facility] 8/31/2015  Continue ASA/Lipitor       Hyperlipidemia  Lab Results   Component Value Date    CHOL 134 02/10/2025    CHOL 109 02/09/2024    CHOL 107 02/10/2023     Lab Results   Component Value Date    HDL 28 (L) 02/10/2025    HDL 27 (L) 02/09/2024    HDL 28 (L) 02/10/2023     Lab Results   Component Value Date    LDLCALC 58 02/10/2025    LDLCALC 43 02/09/2024    LDLCALC 41 02/10/2023     Lab Results   Component Value Date    TRIG 238 (H) 02/10/2025    TRIG 195 (H) 02/09/2024    TRIG 188 (H) 02/10/2023       Lab Results   Component Value Date    CHOLHDL 4.8 02/10/2025    CHOLHDL 4.0 02/09/2024    CHOLHDL 3.8 02/10/2023     Lipitor 10 mg po daily  Lipids followed by PCP    Cardiac pacemaker in situ  Continue ppm interrogations as scheduled    RTC: 6 months

## 2025-02-18 LAB
BATTERY VOLTAGE (V): 2.91 V
ERI (V): 2.83 V
OHS CV DC PP MS1: 0.4 MS
OHS CV DC PP MS2: 0.4 MS
OHS CV DC PP V1: 1.5 V
OHS CV DC PP V2: 2 V
OHS QRS DURATION: 138 MS
OHS QTC CALCULATION: 457 MS

## 2025-02-25 ENCOUNTER — PATIENT MESSAGE (OUTPATIENT)
Dept: FAMILY MEDICINE | Facility: CLINIC | Age: 70
End: 2025-02-25
Payer: MEDICARE

## 2025-02-25 DIAGNOSIS — E11.9 TYPE 2 DIABETES MELLITUS WITHOUT COMPLICATION, WITHOUT LONG-TERM CURRENT USE OF INSULIN: ICD-10-CM

## 2025-02-25 DIAGNOSIS — I10 HYPERTENSION, UNSPECIFIED TYPE: Primary | ICD-10-CM

## 2025-02-25 RX ORDER — LOSARTAN POTASSIUM 25 MG/1
25 TABLET ORAL DAILY
Qty: 90 TABLET | Refills: 1 | Status: SHIPPED | OUTPATIENT
Start: 2025-02-25 | End: 2025-08-24

## 2025-02-25 RX ORDER — GLIMEPIRIDE 4 MG/1
TABLET ORAL
Qty: 135 TABLET | Refills: 1 | Status: SHIPPED | OUTPATIENT
Start: 2025-02-25

## 2025-03-12 DIAGNOSIS — E11.9 TYPE 2 DIABETES MELLITUS WITHOUT COMPLICATION, WITHOUT LONG-TERM CURRENT USE OF INSULIN: ICD-10-CM

## 2025-03-12 RX ORDER — GLIMEPIRIDE 4 MG/1
TABLET ORAL
Qty: 90 TABLET | Refills: 1 | Status: SHIPPED | OUTPATIENT
Start: 2025-03-12

## 2025-03-17 ENCOUNTER — PATIENT MESSAGE (OUTPATIENT)
Dept: FAMILY MEDICINE | Facility: CLINIC | Age: 70
End: 2025-03-17
Payer: MEDICARE

## 2025-03-17 DIAGNOSIS — I10 HYPERTENSION, UNSPECIFIED TYPE: Primary | ICD-10-CM

## 2025-03-18 RX ORDER — LOSARTAN POTASSIUM 50 MG/1
50 TABLET ORAL DAILY
Qty: 90 TABLET | Refills: 1 | Status: SHIPPED | OUTPATIENT
Start: 2025-03-18 | End: 2026-03-18

## 2025-03-20 ENCOUNTER — HOSPITAL ENCOUNTER (OUTPATIENT)
Dept: CARDIOLOGY | Facility: HOSPITAL | Age: 70
Discharge: HOME | End: 2025-03-20
Attending: INTERNAL MEDICINE
Payer: MEDICARE

## 2025-03-20 ENCOUNTER — CLINICAL SUPPORT (OUTPATIENT)
Dept: CARDIOLOGY | Facility: HOSPITAL | Age: 70
End: 2025-03-20

## 2025-03-20 DIAGNOSIS — Z95.0 PRESENCE OF CARDIAC PACEMAKER: ICD-10-CM

## 2025-03-20 DIAGNOSIS — R00.1 BRADYCARDIA, UNSPECIFIED: ICD-10-CM

## 2025-03-20 PROCEDURE — 93296 REM INTERROG EVL PM/IDS: CPT | Performed by: INTERNAL MEDICINE

## 2025-03-20 PROCEDURE — 93294 REM INTERROG EVL PM/LDLS PM: CPT | Mod: ,,, | Performed by: INTERNAL MEDICINE

## 2025-04-01 ENCOUNTER — TELEPHONE (OUTPATIENT)
Dept: FAMILY MEDICINE | Facility: CLINIC | Age: 70
End: 2025-04-01
Payer: MEDICARE

## 2025-04-01 ENCOUNTER — PATIENT MESSAGE (OUTPATIENT)
Dept: FAMILY MEDICINE | Facility: CLINIC | Age: 70
End: 2025-04-01
Payer: MEDICARE

## 2025-04-01 VITALS — DIASTOLIC BLOOD PRESSURE: 66 MMHG | SYSTOLIC BLOOD PRESSURE: 137 MMHG

## 2025-04-01 DIAGNOSIS — I10 HYPERTENSION, UNSPECIFIED TYPE: ICD-10-CM

## 2025-04-01 RX ORDER — LOSARTAN POTASSIUM 100 MG/1
100 TABLET ORAL DAILY
Qty: 90 TABLET | Refills: 1 | Status: SHIPPED | OUTPATIENT
Start: 2025-04-01 | End: 2026-04-01

## 2025-04-16 DIAGNOSIS — I10 HYPERTENSION, UNSPECIFIED TYPE: ICD-10-CM

## 2025-04-16 DIAGNOSIS — J43.1 PANLOBULAR EMPHYSEMA: ICD-10-CM

## 2025-04-16 DIAGNOSIS — E11.9 TYPE 2 DIABETES MELLITUS WITHOUT COMPLICATION, WITHOUT LONG-TERM CURRENT USE OF INSULIN: ICD-10-CM

## 2025-04-16 RX ORDER — DAPAGLIFLOZIN 10 MG/1
TABLET, FILM COATED ORAL
Qty: 90 TABLET | Refills: 0 | OUTPATIENT
Start: 2025-04-16

## 2025-04-16 RX ORDER — FLUTICASONE FUROATE, UMECLIDINIUM BROMIDE AND VILANTEROL TRIFENATATE 100; 62.5; 25 UG/1; UG/1; UG/1
POWDER RESPIRATORY (INHALATION)
Refills: 5 | OUTPATIENT
Start: 2025-04-16

## 2025-04-16 RX ORDER — CARVEDILOL 25 MG/1
TABLET ORAL
Qty: 180 TABLET | Refills: 1 | OUTPATIENT
Start: 2025-04-16

## 2025-04-16 RX ORDER — FLUTICASONE FUROATE, UMECLIDINIUM BROMIDE AND VILANTEROL TRIFENATATE 100; 62.5; 25 UG/1; UG/1; UG/1
POWDER RESPIRATORY (INHALATION)
Qty: 1 EACH | Refills: 5 | Status: SHIPPED | OUTPATIENT
Start: 2025-04-16

## 2025-04-21 DIAGNOSIS — J43.1 PANLOBULAR EMPHYSEMA: ICD-10-CM

## 2025-04-21 RX ORDER — FLUTICASONE FUROATE, UMECLIDINIUM BROMIDE AND VILANTEROL TRIFENATATE 100; 62.5; 25 UG/1; UG/1; UG/1
POWDER RESPIRATORY (INHALATION)
Qty: 30 EACH | Refills: 5 | Status: SHIPPED | OUTPATIENT
Start: 2025-04-21

## 2025-05-02 LAB
OHS CV AF BURDEN PERCENT: < 1
OHS CV DC REMOTE DEVICE TYPE: NORMAL
OHS CV RV PACING PERCENT: 0.07 %

## 2025-05-15 DIAGNOSIS — E78.5 HYPERLIPIDEMIA, UNSPECIFIED HYPERLIPIDEMIA TYPE: ICD-10-CM

## 2025-05-15 DIAGNOSIS — E11.9 TYPE 2 DIABETES MELLITUS WITHOUT COMPLICATION, WITHOUT LONG-TERM CURRENT USE OF INSULIN: ICD-10-CM

## 2025-05-15 RX ORDER — ATORVASTATIN CALCIUM 10 MG/1
TABLET, FILM COATED ORAL
Qty: 90 TABLET | Refills: 1 | Status: SHIPPED | OUTPATIENT
Start: 2025-05-15

## 2025-05-15 RX ORDER — METFORMIN HYDROCHLORIDE 1000 MG/1
TABLET ORAL
Qty: 180 TABLET | Refills: 1 | Status: SHIPPED | OUTPATIENT
Start: 2025-05-15

## 2025-06-09 DIAGNOSIS — E11.9 TYPE 2 DIABETES MELLITUS WITHOUT COMPLICATION, WITHOUT LONG-TERM CURRENT USE OF INSULIN: ICD-10-CM

## 2025-06-09 RX ORDER — GLIMEPIRIDE 4 MG/1
TABLET ORAL
Qty: 135 TABLET | Refills: 0 | Status: SHIPPED | OUTPATIENT
Start: 2025-06-09

## 2025-06-10 RX ORDER — METFORMIN HYDROCHLORIDE 1000 MG/1
TABLET ORAL
Qty: 180 TABLET | Refills: 1 | Status: SHIPPED | OUTPATIENT
Start: 2025-06-10

## 2025-06-19 ENCOUNTER — HOSPITAL ENCOUNTER (OUTPATIENT)
Dept: CARDIOLOGY | Facility: HOSPITAL | Age: 70
Discharge: HOME | End: 2025-06-19
Attending: INTERNAL MEDICINE
Payer: MEDICARE

## 2025-06-19 ENCOUNTER — CLINICAL SUPPORT (OUTPATIENT)
Dept: CARDIOLOGY | Facility: HOSPITAL | Age: 70
End: 2025-06-19
Payer: MEDICARE

## 2025-06-19 DIAGNOSIS — Z95.0 PRESENCE OF CARDIAC PACEMAKER: ICD-10-CM

## 2025-06-19 DIAGNOSIS — R00.1 BRADYCARDIA, UNSPECIFIED: ICD-10-CM

## 2025-06-19 PROCEDURE — 93294 REM INTERROG EVL PM/LDLS PM: CPT | Mod: ,,, | Performed by: INTERNAL MEDICINE

## 2025-06-19 PROCEDURE — 93296 REM INTERROG EVL PM/IDS: CPT | Performed by: INTERNAL MEDICINE

## 2025-06-23 LAB
OHS CV AF BURDEN PERCENT: < 1
OHS CV DC REMOTE DEVICE TYPE: NORMAL
OHS CV RV PACING PERCENT: 0.06 %

## 2025-07-14 DIAGNOSIS — I10 HYPERTENSION, UNSPECIFIED TYPE: ICD-10-CM

## 2025-07-14 DIAGNOSIS — I25.10 CORONARY ARTERY DISEASE INVOLVING NATIVE CORONARY ARTERY OF NATIVE HEART WITHOUT ANGINA PECTORIS: ICD-10-CM

## 2025-07-14 RX ORDER — APIXABAN 5 MG/1
TABLET, FILM COATED ORAL
Qty: 180 TABLET | Refills: 0 | Status: SHIPPED | OUTPATIENT
Start: 2025-07-14

## 2025-07-14 RX ORDER — CARVEDILOL 25 MG/1
TABLET ORAL
Qty: 180 TABLET | Refills: 0 | Status: SHIPPED | OUTPATIENT
Start: 2025-07-14

## 2025-07-22 DIAGNOSIS — I10 HYPERTENSION, UNSPECIFIED TYPE: ICD-10-CM

## 2025-07-22 RX ORDER — LOSARTAN POTASSIUM 100 MG/1
TABLET ORAL
Qty: 90 TABLET | Refills: 1 | Status: SHIPPED | OUTPATIENT
Start: 2025-07-22

## 2025-08-11 ENCOUNTER — OFFICE VISIT (OUTPATIENT)
Dept: FAMILY MEDICINE | Facility: CLINIC | Age: 70
End: 2025-08-11
Payer: MEDICARE

## 2025-08-11 VITALS
HEART RATE: 94 BPM | BODY MASS INDEX: 26.25 KG/M2 | SYSTOLIC BLOOD PRESSURE: 136 MMHG | OXYGEN SATURATION: 99 % | HEIGHT: 70 IN | RESPIRATION RATE: 16 BRPM | DIASTOLIC BLOOD PRESSURE: 78 MMHG | TEMPERATURE: 98 F | WEIGHT: 183.38 LBS

## 2025-08-11 DIAGNOSIS — I25.10 CORONARY ARTERY DISEASE INVOLVING NATIVE CORONARY ARTERY OF NATIVE HEART WITHOUT ANGINA PECTORIS: ICD-10-CM

## 2025-08-11 DIAGNOSIS — E78.5 HYPERLIPIDEMIA, UNSPECIFIED HYPERLIPIDEMIA TYPE: ICD-10-CM

## 2025-08-11 DIAGNOSIS — I10 HYPERTENSION, UNSPECIFIED TYPE: ICD-10-CM

## 2025-08-11 DIAGNOSIS — E11.9 TYPE 2 DIABETES MELLITUS WITHOUT COMPLICATION, WITHOUT LONG-TERM CURRENT USE OF INSULIN: Primary | ICD-10-CM

## 2025-08-11 DIAGNOSIS — I25.118 CORONARY ARTERY DISEASE OF NATIVE ARTERY OF NATIVE HEART WITH STABLE ANGINA PECTORIS: ICD-10-CM

## 2025-08-11 LAB
ANION GAP SERPL CALCULATED.3IONS-SCNC: 18 MMOL/L (ref 7–16)
BUN SERPL-MCNC: 29 MG/DL (ref 8–26)
BUN/CREAT SERPL: 30 (ref 6–20)
CALCIUM SERPL-MCNC: 10 MG/DL (ref 8.8–10)
CHLORIDE SERPL-SCNC: 98 MMOL/L (ref 98–107)
CO2 SERPL-SCNC: 23 MMOL/L (ref 23–31)
CREAT SERPL-MCNC: 0.96 MG/DL (ref 0.72–1.25)
CREAT UR-MCNC: 41 MG/DL (ref 23–375)
EGFR (NO RACE VARIABLE) (RUSH/TITUS): 84 ML/MIN/1.73M2
EST. AVERAGE GLUCOSE BLD GHB EST-MCNC: 160 MG/DL
GLUCOSE SERPL-MCNC: 173 MG/DL (ref 82–115)
HBA1C MFR BLD HPLC: 7.2 %
MICROALBUMIN UR-MCNC: 30.9 MG/DL
MICROALBUMIN/CREAT RATIO PNL UR: 753.7 MG/G (ref 0–30)
POTASSIUM SERPL-SCNC: 5.6 MMOL/L (ref 3.5–5.1)
SODIUM SERPL-SCNC: 133 MMOL/L (ref 136–145)

## 2025-08-11 PROCEDURE — 82570 ASSAY OF URINE CREATININE: CPT | Mod: ,,, | Performed by: CLINICAL MEDICAL LABORATORY

## 2025-08-11 PROCEDURE — 99214 OFFICE O/P EST MOD 30 MIN: CPT | Mod: ,,, | Performed by: INTERNAL MEDICINE

## 2025-08-11 PROCEDURE — 80048 BASIC METABOLIC PNL TOTAL CA: CPT | Mod: ,,, | Performed by: CLINICAL MEDICAL LABORATORY

## 2025-08-11 PROCEDURE — 83036 HEMOGLOBIN GLYCOSYLATED A1C: CPT | Mod: ,,, | Performed by: CLINICAL MEDICAL LABORATORY

## 2025-08-11 PROCEDURE — 82043 UR ALBUMIN QUANTITATIVE: CPT | Mod: ,,, | Performed by: CLINICAL MEDICAL LABORATORY

## 2025-08-11 RX ORDER — APIXABAN 5 MG/1
5 TABLET, FILM COATED ORAL 2 TIMES DAILY
Qty: 180 TABLET | Refills: 1 | Status: SHIPPED | OUTPATIENT
Start: 2025-08-11 | End: 2025-08-12 | Stop reason: SDUPTHER

## 2025-08-11 RX ORDER — GLIMEPIRIDE 4 MG/1
4 TABLET ORAL 2 TIMES DAILY
Qty: 180 TABLET | Refills: 1 | Status: SHIPPED | OUTPATIENT
Start: 2025-08-11 | End: 2025-08-12 | Stop reason: SDUPTHER

## 2025-08-11 RX ORDER — SITAGLIPTIN 100 MG/1
100 TABLET, FILM COATED ORAL DAILY
Qty: 90 TABLET | Refills: 1 | Status: SHIPPED | OUTPATIENT
Start: 2025-08-11 | End: 2025-08-12 | Stop reason: SDUPTHER

## 2025-08-11 RX ORDER — NITROGLYCERIN 0.4 MG/1
0.4 TABLET SUBLINGUAL EVERY 5 MIN PRN
Qty: 25 TABLET | Refills: 3 | Status: SHIPPED | OUTPATIENT
Start: 2025-08-11 | End: 2025-08-12 | Stop reason: SDUPTHER

## 2025-08-11 RX ORDER — DAPAGLIFLOZIN 10 MG/1
10 TABLET, FILM COATED ORAL DAILY
Qty: 90 TABLET | Refills: 1 | Status: SHIPPED | OUTPATIENT
Start: 2025-08-11 | End: 2025-08-12 | Stop reason: SDUPTHER

## 2025-08-11 RX ORDER — CARVEDILOL 25 MG/1
TABLET ORAL
Qty: 180 TABLET | Refills: 1 | Status: SHIPPED | OUTPATIENT
Start: 2025-08-11 | End: 2025-08-12 | Stop reason: SDUPTHER

## 2025-08-11 RX ORDER — ATORVASTATIN CALCIUM 10 MG/1
10 TABLET, FILM COATED ORAL NIGHTLY
Qty: 90 TABLET | Refills: 1 | Status: SHIPPED | OUTPATIENT
Start: 2025-08-11 | End: 2025-08-12 | Stop reason: SDUPTHER

## 2025-08-12 ENCOUNTER — PATIENT MESSAGE (OUTPATIENT)
Dept: FAMILY MEDICINE | Facility: CLINIC | Age: 70
End: 2025-08-12
Payer: MEDICARE

## 2025-08-12 DIAGNOSIS — E78.5 HYPERLIPIDEMIA, UNSPECIFIED HYPERLIPIDEMIA TYPE: ICD-10-CM

## 2025-08-12 DIAGNOSIS — E11.9 TYPE 2 DIABETES MELLITUS WITHOUT COMPLICATION, WITHOUT LONG-TERM CURRENT USE OF INSULIN: ICD-10-CM

## 2025-08-12 DIAGNOSIS — I25.118 CORONARY ARTERY DISEASE OF NATIVE ARTERY OF NATIVE HEART WITH STABLE ANGINA PECTORIS: ICD-10-CM

## 2025-08-12 DIAGNOSIS — J43.1 PANLOBULAR EMPHYSEMA: ICD-10-CM

## 2025-08-12 DIAGNOSIS — I10 HYPERTENSION, UNSPECIFIED TYPE: ICD-10-CM

## 2025-08-12 DIAGNOSIS — I25.10 CORONARY ARTERY DISEASE INVOLVING NATIVE CORONARY ARTERY OF NATIVE HEART WITHOUT ANGINA PECTORIS: ICD-10-CM

## 2025-08-12 RX ORDER — NITROGLYCERIN 0.4 MG/1
0.4 TABLET SUBLINGUAL EVERY 5 MIN PRN
Qty: 25 TABLET | Refills: 3 | Status: SHIPPED | OUTPATIENT
Start: 2025-08-12 | End: 2026-08-12

## 2025-08-12 RX ORDER — SITAGLIPTIN 100 MG/1
100 TABLET, FILM COATED ORAL DAILY
Qty: 90 TABLET | Refills: 1 | Status: SHIPPED | OUTPATIENT
Start: 2025-08-12

## 2025-08-12 RX ORDER — LOSARTAN POTASSIUM 100 MG/1
100 TABLET ORAL DAILY
Qty: 90 TABLET | Refills: 1 | Status: SHIPPED | OUTPATIENT
Start: 2025-08-12

## 2025-08-12 RX ORDER — METFORMIN HYDROCHLORIDE 1000 MG/1
TABLET ORAL
Qty: 180 TABLET | Refills: 1 | Status: SHIPPED | OUTPATIENT
Start: 2025-08-12

## 2025-08-12 RX ORDER — FLUTICASONE FUROATE, UMECLIDINIUM BROMIDE AND VILANTEROL TRIFENATATE 100; 62.5; 25 UG/1; UG/1; UG/1
POWDER RESPIRATORY (INHALATION)
Qty: 30 EACH | Refills: 5 | Status: SHIPPED | OUTPATIENT
Start: 2025-08-12

## 2025-08-12 RX ORDER — APIXABAN 5 MG/1
5 TABLET, FILM COATED ORAL 2 TIMES DAILY
Qty: 180 TABLET | Refills: 1 | Status: SHIPPED | OUTPATIENT
Start: 2025-08-12

## 2025-08-12 RX ORDER — DAPAGLIFLOZIN 10 MG/1
10 TABLET, FILM COATED ORAL DAILY
Qty: 90 TABLET | Refills: 1 | Status: SHIPPED | OUTPATIENT
Start: 2025-08-12

## 2025-08-12 RX ORDER — GLIMEPIRIDE 4 MG/1
4 TABLET ORAL 2 TIMES DAILY
Qty: 180 TABLET | Refills: 1 | Status: SHIPPED | OUTPATIENT
Start: 2025-08-12

## 2025-08-12 RX ORDER — ATORVASTATIN CALCIUM 10 MG/1
10 TABLET, FILM COATED ORAL NIGHTLY
Qty: 90 TABLET | Refills: 1 | Status: SHIPPED | OUTPATIENT
Start: 2025-08-12

## 2025-08-12 RX ORDER — CARVEDILOL 25 MG/1
TABLET ORAL
Qty: 180 TABLET | Refills: 1 | Status: SHIPPED | OUTPATIENT
Start: 2025-08-12

## 2025-08-18 ENCOUNTER — OFFICE VISIT (OUTPATIENT)
Dept: CARDIOLOGY | Facility: CLINIC | Age: 70
End: 2025-08-18
Payer: MEDICARE

## 2025-08-18 VITALS
WEIGHT: 183.38 LBS | DIASTOLIC BLOOD PRESSURE: 64 MMHG | OXYGEN SATURATION: 99 % | HEART RATE: 78 BPM | BODY MASS INDEX: 26.25 KG/M2 | SYSTOLIC BLOOD PRESSURE: 134 MMHG | HEIGHT: 70 IN

## 2025-08-18 DIAGNOSIS — I25.118 CORONARY ARTERY DISEASE OF NATIVE ARTERY OF NATIVE HEART WITH STABLE ANGINA PECTORIS: ICD-10-CM

## 2025-08-18 DIAGNOSIS — E78.5 HYPERLIPIDEMIA, UNSPECIFIED HYPERLIPIDEMIA TYPE: Chronic | ICD-10-CM

## 2025-08-18 DIAGNOSIS — I48.91 ATRIAL FIBRILLATION, UNSPECIFIED TYPE: Primary | ICD-10-CM

## 2025-08-18 DIAGNOSIS — Z95.0 CARDIAC PACEMAKER IN SITU: ICD-10-CM

## 2025-08-18 DIAGNOSIS — I10 HYPERTENSION, UNSPECIFIED TYPE: Chronic | ICD-10-CM

## 2025-08-18 DIAGNOSIS — I44.2 COMPLETE AV BLOCK: Chronic | ICD-10-CM

## 2025-08-18 PROCEDURE — 99214 OFFICE O/P EST MOD 30 MIN: CPT | Mod: PBBFAC | Performed by: NURSE PRACTITIONER

## 2025-08-18 PROCEDURE — 99214 OFFICE O/P EST MOD 30 MIN: CPT | Mod: S$PBB,,, | Performed by: NURSE PRACTITIONER

## 2025-08-18 PROCEDURE — 93010 ELECTROCARDIOGRAM REPORT: CPT | Mod: S$PBB,,, | Performed by: INTERNAL MEDICINE

## 2025-08-18 PROCEDURE — 99999 PR PBB SHADOW E&M-EST. PATIENT-LVL IV: CPT | Mod: PBBFAC,,, | Performed by: NURSE PRACTITIONER

## 2025-08-18 PROCEDURE — 93005 ELECTROCARDIOGRAM TRACING: CPT | Mod: PBBFAC | Performed by: INTERNAL MEDICINE

## 2025-08-19 LAB
OHS QRS DURATION: 136 MS
OHS QTC CALCULATION: 469 MS

## 2025-08-27 ENCOUNTER — TELEPHONE (OUTPATIENT)
Dept: FAMILY MEDICINE | Facility: CLINIC | Age: 70
End: 2025-08-27
Payer: MEDICARE

## 2025-08-27 DIAGNOSIS — E87.5 HYPERKALEMIA: Primary | ICD-10-CM

## 2025-09-02 ENCOUNTER — OFFICE VISIT (OUTPATIENT)
Dept: FAMILY MEDICINE | Facility: CLINIC | Age: 70
End: 2025-09-02
Payer: MEDICARE

## 2025-09-02 VITALS
WEIGHT: 188 LBS | TEMPERATURE: 98 F | OXYGEN SATURATION: 96 % | BODY MASS INDEX: 26.92 KG/M2 | RESPIRATION RATE: 16 BRPM | DIASTOLIC BLOOD PRESSURE: 83 MMHG | SYSTOLIC BLOOD PRESSURE: 164 MMHG | HEART RATE: 90 BPM | HEIGHT: 70 IN

## 2025-09-02 DIAGNOSIS — E87.5 HYPERKALEMIA: Primary | ICD-10-CM

## 2025-09-02 DIAGNOSIS — I10 PRIMARY HYPERTENSION: Chronic | ICD-10-CM

## 2025-09-02 LAB
ANION GAP SERPL CALCULATED.3IONS-SCNC: 17 MMOL/L (ref 7–16)
BUN SERPL-MCNC: 27 MG/DL (ref 8–26)
BUN/CREAT SERPL: 22 (ref 6–20)
CALCIUM SERPL-MCNC: 9.8 MG/DL (ref 8.8–10)
CHLORIDE SERPL-SCNC: 100 MMOL/L (ref 98–107)
CO2 SERPL-SCNC: 25 MMOL/L (ref 23–31)
CREAT SERPL-MCNC: 1.24 MG/DL (ref 0.72–1.25)
EGFR (NO RACE VARIABLE) (RUSH/TITUS): 62 ML/MIN/1.73M2
GLUCOSE SERPL-MCNC: 131 MG/DL (ref 82–115)
POTASSIUM SERPL-SCNC: 4.8 MMOL/L (ref 3.5–5.1)
SODIUM SERPL-SCNC: 137 MMOL/L (ref 136–145)

## 2025-09-02 PROCEDURE — 93005 ELECTROCARDIOGRAM TRACING: CPT | Mod: RHCUB | Performed by: INTERNAL MEDICINE

## 2025-09-02 PROCEDURE — 99214 OFFICE O/P EST MOD 30 MIN: CPT | Mod: ,,, | Performed by: INTERNAL MEDICINE

## 2025-09-02 PROCEDURE — 93010 ELECTROCARDIOGRAM REPORT: CPT | Mod: ,,, | Performed by: INTERNAL MEDICINE

## 2025-09-02 PROCEDURE — 80048 BASIC METABOLIC PNL TOTAL CA: CPT | Mod: ,,, | Performed by: CLINICAL MEDICAL LABORATORY

## 2025-09-03 LAB
EKG 12-LEAD: NORMAL
PR INTERVAL: NORMAL
PRT AXES: NORMAL
QRS DURATION: NORMAL
QT/QTC: NORMAL
VENTRICULAR RATE: NORMAL